# Patient Record
Sex: MALE | Race: WHITE | Employment: OTHER | URBAN - METROPOLITAN AREA
[De-identification: names, ages, dates, MRNs, and addresses within clinical notes are randomized per-mention and may not be internally consistent; named-entity substitution may affect disease eponyms.]

---

## 2017-11-17 PROBLEM — Z98.890: Status: ACTIVE | Noted: 2017-11-17

## 2018-06-29 ENCOUNTER — HOSPITAL ENCOUNTER (OUTPATIENT)
Age: 68
Discharge: HOME | End: 2018-07-01
Payer: MEDICARE

## 2018-06-29 PROCEDURE — 87077 CULTURE AEROBIC IDENTIFY: CPT

## 2018-06-29 PROCEDURE — 87088 URINE BACTERIA CULTURE: CPT

## 2018-06-29 PROCEDURE — 87186 SC STD MICRODIL/AGAR DIL: CPT

## 2018-07-03 LAB
ORGANISM: ABNORMAL
URINE CULTURE, ROUTINE: ABNORMAL
URINE CULTURE, ROUTINE: ABNORMAL

## 2018-09-11 ENCOUNTER — HOSPITAL ENCOUNTER (OUTPATIENT)
Age: 68
Discharge: HOME | End: 2018-09-13
Payer: MEDICARE

## 2018-09-11 LAB — PROSTATE SPECIFIC ANTIGEN: 3.66 NG/ML (ref 0–4)

## 2018-09-11 PROCEDURE — 84153 ASSAY OF PSA TOTAL: CPT

## 2019-06-07 ENCOUNTER — HOSPITAL ENCOUNTER (OUTPATIENT)
Age: 69
Discharge: HOME | End: 2019-06-09

## 2019-06-07 PROCEDURE — 87081 CULTURE SCREEN ONLY: CPT

## 2019-06-07 PROCEDURE — 88305 TISSUE EXAM BY PATHOLOGIST: CPT

## 2019-06-08 LAB — CLOTEST: NORMAL

## 2019-09-17 ENCOUNTER — HOSPITAL ENCOUNTER (OUTPATIENT)
Age: 69
Discharge: HOME | End: 2019-09-19
Payer: MEDICARE

## 2019-09-17 LAB — PROSTATE SPECIFIC ANTIGEN: 3.67 NG/ML (ref 0–4)

## 2019-09-17 PROCEDURE — 84153 ASSAY OF PSA TOTAL: CPT

## 2020-09-21 ENCOUNTER — HOSPITAL ENCOUNTER (OUTPATIENT)
Age: 70
Discharge: HOME | End: 2020-09-23
Payer: MEDICARE

## 2020-09-21 PROCEDURE — G0103 PSA SCREENING: HCPCS

## 2020-09-21 PROCEDURE — 84153 ASSAY OF PSA TOTAL: CPT

## 2020-09-29 LAB
PROSTATE SPECIFIC ANTIGEN: 5.26 NG/ML (ref 0–4)
PROSTATE SPECIFIC ANTIGEN: ABNORMAL NG/ML (ref 0–4)

## 2023-12-13 ENCOUNTER — HOSPITAL ENCOUNTER (OUTPATIENT)
Age: 73
Discharge: HOME | End: 2023-12-15

## 2023-12-13 PROCEDURE — 87077 CULTURE AEROBIC IDENTIFY: CPT

## 2023-12-14 LAB
HELIOBACTER PYLORI ID: NEGATIVE
SOURCE, 60200063: NORMAL

## 2024-10-03 ENCOUNTER — HOSPITAL ENCOUNTER (INPATIENT)
Age: 74
LOS: 12 days | Discharge: HOME HEALTH | DRG: 378 | End: 2024-10-15
Attending: EMERGENCY MEDICINE | Admitting: INTERNAL MEDICINE
Payer: MEDICARE

## 2024-10-03 ENCOUNTER — APPOINTMENT (OUTPATIENT)
Dept: NUCLEAR MEDICINE | Age: 74
DRG: 378 | End: 2024-10-03
Payer: MEDICARE

## 2024-10-03 DIAGNOSIS — K52.9 CHRONIC DIARRHEA: ICD-10-CM

## 2024-10-03 DIAGNOSIS — K92.2 LOWER GI BLEED: ICD-10-CM

## 2024-10-03 DIAGNOSIS — R60.0 EDEMA OF LEFT UPPER ARM: ICD-10-CM

## 2024-10-03 DIAGNOSIS — K92.1 HEMATOCHEZIA: ICD-10-CM

## 2024-10-03 DIAGNOSIS — R60.1 ANASARCA: Primary | ICD-10-CM

## 2024-10-03 PROBLEM — F32.9 MDD (MAJOR DEPRESSIVE DISORDER): Status: ACTIVE | Noted: 2024-10-03

## 2024-10-03 PROBLEM — R73.9 ACUTE HYPERGLYCEMIA: Status: ACTIVE | Noted: 2024-10-03

## 2024-10-03 PROBLEM — N17.9 ACUTE KIDNEY INJURY (HCC): Status: ACTIVE | Noted: 2024-10-03

## 2024-10-03 LAB
ALBUMIN SERPL-MCNC: 3.4 G/DL (ref 3.5–5.2)
ALP SERPL-CCNC: 50 U/L (ref 40–129)
ALT SERPL-CCNC: 8 U/L (ref 0–40)
ANION GAP SERPL CALCULATED.3IONS-SCNC: 13 MMOL/L (ref 7–16)
AST SERPL-CCNC: 14 U/L (ref 0–39)
BASOPHILS # BLD: 0.07 K/UL (ref 0–0.2)
BASOPHILS NFR BLD: 1 % (ref 0–2)
BILIRUB SERPL-MCNC: 0.2 MG/DL (ref 0–1.2)
BUN SERPL-MCNC: 28 MG/DL (ref 6–23)
CALCIUM SERPL-MCNC: 9.1 MG/DL (ref 8.6–10.2)
CHLORIDE SERPL-SCNC: 110 MMOL/L (ref 98–107)
CK SERPL-CCNC: 42 U/L (ref 20–200)
CO2 SERPL-SCNC: 14 MMOL/L (ref 22–29)
CREAT SERPL-MCNC: 2.4 MG/DL (ref 0.7–1.2)
EOSINOPHIL # BLD: 0.31 K/UL (ref 0.05–0.5)
EOSINOPHILS RELATIVE PERCENT: 2 % (ref 0–6)
ERYTHROCYTE [DISTWIDTH] IN BLOOD BY AUTOMATED COUNT: 15.4 % (ref 11.5–15)
FERRITIN SERPL-MCNC: 65 NG/ML
GFR, ESTIMATED: 27 ML/MIN/1.73M2
GLUCOSE BLD-MCNC: 138 MG/DL (ref 74–99)
GLUCOSE BLD-MCNC: 161 MG/DL (ref 74–99)
GLUCOSE SERPL-MCNC: 220 MG/DL (ref 74–99)
HBA1C MFR BLD: 5.5 % (ref 4–5.6)
HCT VFR BLD AUTO: 32.9 % (ref 37–54)
HCT VFR BLD AUTO: 33.6 % (ref 37–54)
HGB BLD-MCNC: 10.4 G/DL (ref 12.5–16.5)
HGB BLD-MCNC: 10.5 G/DL (ref 12.5–16.5)
HGB BLD-MCNC: 12.2 G/DL (ref 12.5–16.5)
IMM GRANULOCYTES # BLD AUTO: 0.07 K/UL (ref 0–0.58)
IMM GRANULOCYTES NFR BLD: 1 % (ref 0–5)
INR PPP: 1.9
IRON SATN MFR SERPL: 9 % (ref 20–55)
IRON SERPL-MCNC: 39 UG/DL (ref 59–158)
LYMPHOCYTES NFR BLD: 4.31 K/UL (ref 1.5–4)
LYMPHOCYTES RELATIVE PERCENT: 32 % (ref 20–42)
MAGNESIUM SERPL-MCNC: 1.7 MG/DL (ref 1.6–2.6)
MCH RBC QN AUTO: 30.8 PG (ref 26–35)
MCHC RBC AUTO-ENTMCNC: 31.3 G/DL (ref 32–34.5)
MCV RBC AUTO: 98.5 FL (ref 80–99.9)
MONOCYTES NFR BLD: 0.87 K/UL (ref 0.1–0.95)
MONOCYTES NFR BLD: 7 % (ref 2–12)
NEUTROPHILS NFR BLD: 58 % (ref 43–80)
NEUTS SEG NFR BLD: 7.77 K/UL (ref 1.8–7.3)
PLATELET # BLD AUTO: 354 K/UL (ref 130–450)
PMV BLD AUTO: 11.5 FL (ref 7–12)
POTASSIUM SERPL-SCNC: 3.8 MMOL/L (ref 3.5–5)
PROT SERPL-MCNC: 6.5 G/DL (ref 6.4–8.3)
PROTHROMBIN TIME: 19.9 SEC (ref 9.3–12.4)
RBC # BLD AUTO: 3.41 M/UL (ref 3.8–5.8)
SODIUM SERPL-SCNC: 137 MMOL/L (ref 132–146)
TIBC SERPL-MCNC: 413 UG/DL (ref 250–450)
WBC OTHER # BLD: 13.4 K/UL (ref 4.5–11.5)

## 2024-10-03 PROCEDURE — 2580000003 HC RX 258: Performed by: EMERGENCY MEDICINE

## 2024-10-03 PROCEDURE — 2060000000 HC ICU INTERMEDIATE R&B

## 2024-10-03 PROCEDURE — 80053 COMPREHEN METABOLIC PANEL: CPT

## 2024-10-03 PROCEDURE — 78278 ACUTE GI BLOOD LOSS IMAGING: CPT

## 2024-10-03 PROCEDURE — 85018 HEMOGLOBIN: CPT

## 2024-10-03 PROCEDURE — 82550 ASSAY OF CK (CPK): CPT

## 2024-10-03 PROCEDURE — 86920 COMPATIBILITY TEST SPIN: CPT

## 2024-10-03 PROCEDURE — 85014 HEMATOCRIT: CPT

## 2024-10-03 PROCEDURE — 82728 ASSAY OF FERRITIN: CPT

## 2024-10-03 PROCEDURE — 86901 BLOOD TYPING SEROLOGIC RH(D): CPT

## 2024-10-03 PROCEDURE — 2580000003 HC RX 258: Performed by: HOSPITALIST

## 2024-10-03 PROCEDURE — 87449 NOS EACH ORGANISM AG IA: CPT

## 2024-10-03 PROCEDURE — 6370000000 HC RX 637 (ALT 250 FOR IP): Performed by: HOSPITALIST

## 2024-10-03 PROCEDURE — 6360000002 HC RX W HCPCS: Performed by: EMERGENCY MEDICINE

## 2024-10-03 PROCEDURE — 83036 HEMOGLOBIN GLYCOSYLATED A1C: CPT

## 2024-10-03 PROCEDURE — 87324 CLOSTRIDIUM AG IA: CPT

## 2024-10-03 PROCEDURE — 3430000000 HC RX DIAGNOSTIC RADIOPHARMACEUTICAL: Performed by: RADIOLOGY

## 2024-10-03 PROCEDURE — 86900 BLOOD TYPING SEROLOGIC ABO: CPT

## 2024-10-03 PROCEDURE — 86923 COMPATIBILITY TEST ELECTRIC: CPT

## 2024-10-03 PROCEDURE — 83540 ASSAY OF IRON: CPT

## 2024-10-03 PROCEDURE — 30233N1 TRANSFUSION OF NONAUTOLOGOUS RED BLOOD CELLS INTO PERIPHERAL VEIN, PERCUTANEOUS APPROACH: ICD-10-PCS | Performed by: HOSPITALIST

## 2024-10-03 PROCEDURE — 99285 EMERGENCY DEPT VISIT HI MDM: CPT

## 2024-10-03 PROCEDURE — 83550 IRON BINDING TEST: CPT

## 2024-10-03 PROCEDURE — 82962 GLUCOSE BLOOD TEST: CPT

## 2024-10-03 PROCEDURE — 83735 ASSAY OF MAGNESIUM: CPT

## 2024-10-03 PROCEDURE — P9016 RBC LEUKOCYTES REDUCED: HCPCS

## 2024-10-03 PROCEDURE — 85610 PROTHROMBIN TIME: CPT

## 2024-10-03 PROCEDURE — 85025 COMPLETE CBC W/AUTO DIFF WBC: CPT

## 2024-10-03 PROCEDURE — A9560 TC99M LABELED RBC: HCPCS | Performed by: RADIOLOGY

## 2024-10-03 PROCEDURE — 86850 RBC ANTIBODY SCREEN: CPT

## 2024-10-03 RX ORDER — ONDANSETRON 2 MG/ML
4 INJECTION INTRAMUSCULAR; INTRAVENOUS EVERY 6 HOURS PRN
Status: DISCONTINUED | OUTPATIENT
Start: 2024-10-03 | End: 2024-10-15 | Stop reason: HOSPADM

## 2024-10-03 RX ORDER — 0.9 % SODIUM CHLORIDE 0.9 %
1000 INTRAVENOUS SOLUTION INTRAVENOUS ONCE
Status: COMPLETED | OUTPATIENT
Start: 2024-10-03 | End: 2024-10-03

## 2024-10-03 RX ORDER — SODIUM CHLORIDE 9 MG/ML
INJECTION, SOLUTION INTRAVENOUS CONTINUOUS
Status: DISCONTINUED | OUTPATIENT
Start: 2024-10-03 | End: 2024-10-04

## 2024-10-03 RX ORDER — CLONAZEPAM 0.5 MG/1
0.5 TABLET ORAL EVERY 12 HOURS PRN
Status: DISCONTINUED | OUTPATIENT
Start: 2024-10-03 | End: 2024-10-15 | Stop reason: HOSPADM

## 2024-10-03 RX ORDER — CETIRIZINE HYDROCHLORIDE 10 MG/1
10 TABLET ORAL DAILY
Status: DISCONTINUED | OUTPATIENT
Start: 2024-10-03 | End: 2024-10-15 | Stop reason: HOSPADM

## 2024-10-03 RX ORDER — FINASTERIDE 5 MG/1
5 TABLET, FILM COATED ORAL NIGHTLY
COMMUNITY

## 2024-10-03 RX ORDER — INSULIN LISPRO 100 [IU]/ML
0-4 INJECTION, SOLUTION INTRAVENOUS; SUBCUTANEOUS
Status: DISCONTINUED | OUTPATIENT
Start: 2024-10-03 | End: 2024-10-15 | Stop reason: HOSPADM

## 2024-10-03 RX ORDER — FINASTERIDE 5 MG/1
5 TABLET, FILM COATED ORAL NIGHTLY
Status: DISCONTINUED | OUTPATIENT
Start: 2024-10-03 | End: 2024-10-15 | Stop reason: HOSPADM

## 2024-10-03 RX ORDER — SODIUM CHLORIDE 0.9 % (FLUSH) 0.9 %
5-40 SYRINGE (ML) INJECTION PRN
Status: DISCONTINUED | OUTPATIENT
Start: 2024-10-03 | End: 2024-10-15 | Stop reason: HOSPADM

## 2024-10-03 RX ORDER — ASCORBIC ACID 500 MG
1000 TABLET ORAL
Status: DISCONTINUED | OUTPATIENT
Start: 2024-10-03 | End: 2024-10-15

## 2024-10-03 RX ORDER — PANTOPRAZOLE SODIUM 40 MG/1
40 TABLET, DELAYED RELEASE ORAL
Status: DISCONTINUED | OUTPATIENT
Start: 2024-10-03 | End: 2024-10-04

## 2024-10-03 RX ORDER — GLUCAGON 1 MG/ML
1 KIT INJECTION PRN
Status: DISCONTINUED | OUTPATIENT
Start: 2024-10-03 | End: 2024-10-15 | Stop reason: HOSPADM

## 2024-10-03 RX ORDER — ARIPIPRAZOLE 2 MG/1
2 TABLET ORAL EVERY MORNING
COMMUNITY

## 2024-10-03 RX ORDER — DICYCLOMINE HYDROCHLORIDE 10 MG/1
10 CAPSULE ORAL 2 TIMES DAILY WITH MEALS
Status: ON HOLD | COMMUNITY
End: 2024-10-15 | Stop reason: HOSPADM

## 2024-10-03 RX ORDER — DEXTROSE MONOHYDRATE 100 MG/ML
INJECTION, SOLUTION INTRAVENOUS CONTINUOUS PRN
Status: DISCONTINUED | OUTPATIENT
Start: 2024-10-03 | End: 2024-10-15 | Stop reason: HOSPADM

## 2024-10-03 RX ORDER — MIDODRINE HYDROCHLORIDE 5 MG/1
5 TABLET ORAL
Status: DISCONTINUED | OUTPATIENT
Start: 2024-10-03 | End: 2024-10-11

## 2024-10-03 RX ORDER — MULTIVIT WITH MINERALS/LUTEIN
1000 TABLET ORAL
Status: ON HOLD | COMMUNITY
End: 2024-10-15 | Stop reason: HOSPADM

## 2024-10-03 RX ORDER — SODIUM CHLORIDE 9 MG/ML
INJECTION, SOLUTION INTRAVENOUS PRN
Status: DISCONTINUED | OUTPATIENT
Start: 2024-10-03 | End: 2024-10-15 | Stop reason: HOSPADM

## 2024-10-03 RX ORDER — INSULIN LISPRO 100 [IU]/ML
0-4 INJECTION, SOLUTION INTRAVENOUS; SUBCUTANEOUS NIGHTLY
Status: DISCONTINUED | OUTPATIENT
Start: 2024-10-03 | End: 2024-10-15 | Stop reason: HOSPADM

## 2024-10-03 RX ORDER — ACETAMINOPHEN 325 MG/1
650 TABLET ORAL EVERY 6 HOURS PRN
Status: DISCONTINUED | OUTPATIENT
Start: 2024-10-03 | End: 2024-10-15 | Stop reason: HOSPADM

## 2024-10-03 RX ORDER — ONDANSETRON 4 MG/1
4 TABLET, ORALLY DISINTEGRATING ORAL EVERY 8 HOURS PRN
Status: DISCONTINUED | OUTPATIENT
Start: 2024-10-03 | End: 2024-10-15 | Stop reason: HOSPADM

## 2024-10-03 RX ORDER — HYDROCORTISONE 25 MG/G
CREAM TOPICAL 2 TIMES DAILY
Status: DISCONTINUED | OUTPATIENT
Start: 2024-10-03 | End: 2024-10-15 | Stop reason: HOSPADM

## 2024-10-03 RX ORDER — LEVOTHYROXINE SODIUM 75 UG/1
75 TABLET ORAL
Status: DISCONTINUED | OUTPATIENT
Start: 2024-10-04 | End: 2024-10-15 | Stop reason: HOSPADM

## 2024-10-03 RX ORDER — DULOXETIN HYDROCHLORIDE 60 MG/1
60 CAPSULE, DELAYED RELEASE ORAL 2 TIMES DAILY WITH MEALS
Status: DISCONTINUED | OUTPATIENT
Start: 2024-10-03 | End: 2024-10-15 | Stop reason: HOSPADM

## 2024-10-03 RX ORDER — ARIPIPRAZOLE 2 MG/1
2 TABLET ORAL EVERY MORNING
Status: DISCONTINUED | OUTPATIENT
Start: 2024-10-04 | End: 2024-10-15 | Stop reason: HOSPADM

## 2024-10-03 RX ORDER — MONTELUKAST SODIUM 10 MG/1
10 TABLET ORAL EVERY MORNING
Status: DISCONTINUED | OUTPATIENT
Start: 2024-10-04 | End: 2024-10-15 | Stop reason: HOSPADM

## 2024-10-03 RX ORDER — SODIUM CHLORIDE 9 MG/ML
50 INJECTION, SOLUTION INTRAVENOUS ONCE
Status: DISCONTINUED | OUTPATIENT
Start: 2024-10-03 | End: 2024-10-15 | Stop reason: HOSPADM

## 2024-10-03 RX ORDER — SODIUM CHLORIDE 0.9 % (FLUSH) 0.9 %
5-40 SYRINGE (ML) INJECTION EVERY 12 HOURS SCHEDULED
Status: DISCONTINUED | OUTPATIENT
Start: 2024-10-03 | End: 2024-10-15 | Stop reason: HOSPADM

## 2024-10-03 RX ORDER — TAMSULOSIN HYDROCHLORIDE 0.4 MG/1
0.4 CAPSULE ORAL NIGHTLY
Status: ON HOLD | COMMUNITY
End: 2024-10-15 | Stop reason: HOSPADM

## 2024-10-03 RX ORDER — TAMSULOSIN HYDROCHLORIDE 0.4 MG/1
0.4 CAPSULE ORAL NIGHTLY
Status: DISCONTINUED | OUTPATIENT
Start: 2024-10-03 | End: 2024-10-15

## 2024-10-03 RX ORDER — ACETAMINOPHEN 650 MG/1
650 SUPPOSITORY RECTAL EVERY 6 HOURS PRN
Status: DISCONTINUED | OUTPATIENT
Start: 2024-10-03 | End: 2024-10-15 | Stop reason: HOSPADM

## 2024-10-03 RX ADMIN — PROTHROMBIN COMPLEX CONCENTRATE (HUMAN) 2000 UNITS: 25.5; 16.5; 24; 22; 22; 26 POWDER, FOR SOLUTION INTRAVENOUS at 07:51

## 2024-10-03 RX ADMIN — SODIUM CHLORIDE, PRESERVATIVE FREE 10 ML: 5 INJECTION INTRAVENOUS at 21:38

## 2024-10-03 RX ADMIN — HYDROCORTISONE 2.5%: 25 CREAM TOPICAL at 21:38

## 2024-10-03 RX ADMIN — SODIUM CHLORIDE 1000 ML: 9 INJECTION, SOLUTION INTRAVENOUS at 17:08

## 2024-10-03 RX ADMIN — Medication 20 MILLICURIE: at 14:15

## 2024-10-03 RX ADMIN — CETIRIZINE HYDROCHLORIDE 10 MG: 10 TABLET, FILM COATED ORAL at 16:42

## 2024-10-03 RX ADMIN — PANTOPRAZOLE SODIUM 40 MG: 40 TABLET, DELAYED RELEASE ORAL at 16:42

## 2024-10-03 RX ADMIN — TAMSULOSIN HYDROCHLORIDE 0.4 MG: 0.4 CAPSULE ORAL at 21:37

## 2024-10-03 RX ADMIN — FINASTERIDE 5 MG: 5 TABLET, FILM COATED ORAL at 21:38

## 2024-10-03 RX ADMIN — OXYCODONE HYDROCHLORIDE AND ACETAMINOPHEN 1000 MG: 500 TABLET ORAL at 16:42

## 2024-10-03 RX ADMIN — DULOXETINE HYDROCHLORIDE 60 MG: 60 CAPSULE, DELAYED RELEASE ORAL at 16:42

## 2024-10-03 RX ADMIN — SODIUM CHLORIDE 1000 ML: 9 INJECTION, SOLUTION INTRAVENOUS at 07:35

## 2024-10-03 RX ADMIN — SODIUM CHLORIDE: 9 INJECTION, SOLUTION INTRAVENOUS at 17:49

## 2024-10-03 RX ADMIN — MIDODRINE HYDROCHLORIDE 5 MG: 5 TABLET ORAL at 17:21

## 2024-10-03 ASSESSMENT — PAIN SCALES - GENERAL
PAINLEVEL_OUTOF10: 6
PAINLEVEL_OUTOF10: 3

## 2024-10-03 ASSESSMENT — LIFESTYLE VARIABLES
HOW MANY STANDARD DRINKS CONTAINING ALCOHOL DO YOU HAVE ON A TYPICAL DAY: PATIENT DOES NOT DRINK
HOW OFTEN DO YOU HAVE A DRINK CONTAINING ALCOHOL: NEVER

## 2024-10-03 ASSESSMENT — PAIN DESCRIPTION - DESCRIPTORS: DESCRIPTORS: ACHING

## 2024-10-03 ASSESSMENT — PAIN - FUNCTIONAL ASSESSMENT
PAIN_FUNCTIONAL_ASSESSMENT: ACTIVITIES ARE NOT PREVENTED
PAIN_FUNCTIONAL_ASSESSMENT: 0-10

## 2024-10-03 ASSESSMENT — PAIN DESCRIPTION - LOCATION
LOCATION: BACK
LOCATION: ABDOMEN

## 2024-10-03 ASSESSMENT — PAIN DESCRIPTION - ORIENTATION
ORIENTATION: LEFT;LOWER
ORIENTATION: LOWER

## 2024-10-03 NOTE — ACP (ADVANCE CARE PLANNING)
Advance Care Planning   Healthcare Decision Maker:    Primary Decision Maker: Alvina Kwok - [facility] - [phone]    Click here to complete Healthcare Decision Makers including selection of the Healthcare Decision Maker Relationship (ie \"Primary\").  Today we documented Decision Maker(s) consistent with Legal Next of Kin hierarchy.

## 2024-10-03 NOTE — H&P
Internal Medicine History & Physical     Name: Anuel Kwok  : 1950  Chief Complaint: Rectal Bleeding (Bright red blood rectally since 2am, hx of bowel rescation )  Primary Care Physician: Valentín Christianson MD  Admission date: 10/3/2024  Date of service: 10/3/2024     History of Present Illness  Anuel is a 74 y.o. year old male.  Patient presents with dizziness and feeling ill with initiation of bright red blood per rectum starting around 2 AM.  Patient has had bowel surgeries in the past and has not had any history of bloody stools according to him.  He denies any fever, chills, headache, vision or hearing changes, dysuria, hematuria.  Patient states that he is still not feeling well while getting his nuclear medicine scan.  Feels dizzy and lightheaded while laying down.  Hemoglobin on arrival to the ER was 10 and he received a unit of blood at that time.  He now states that he is hungry and wants to eat.  He is on Xarelto for history of pulmonary emboli bilaterally.  Patient states nothing is making better at home.  Still having bloody stools as of this afternoon.  Patient to be seen by GI for further recommendations.  Bleeding scan was ordered.      ED course:   Initial blood work and imaging studies performed. Admission recommended by ED physician. Case was discussed with ED provider. Meds in ED consisted of the following:  Medications   prothrombin complex human-lans (BALFAXAR) infusion 2,000 Units (2,000 Units IntraVENous New Bag 10/3/24 0751)     Followed by   0.9 % sodium chloride infusion (50 mLs IntraVENous Not Given 10/3/24 6077)   0.9 % sodium chloride infusion (has no administration in time range)   pantoprazole (PROTONIX) tablet 40 mg (has no administration in time range)   hydrocortisone (ANUSOL-HC) 2.5 % rectal cream (has no administration in time range)   sodium chloride 0.9 % bolus 1,000 mL (0 mLs IntraVENous Stopped 10/3/24 0752)       Past Medical History:   Diagnosis Date

## 2024-10-03 NOTE — CARE COORDINATION
Social Work/Discharge Planning:  Met with patient and his wife Alvina and completed initial assessment.  Explained Social Work role and discussed transition of care/discharge planning.  Patient lives with his wife in a one story house with two steps to enter.  PTA he is independent with no adaptive device.  He has a shower chair at home.  He has a history at [facility].  Plan is home at discharge and he denies any home care needs at this time.  Will continue to follow and assist if needed.  Electronically signed by CHATO Yuan on 10/3/2024 at 1:59 PM

## 2024-10-03 NOTE — ED NOTES
ED to Inpatient Handoff Report    Notified Cele that electronic handoff available and patient ready for transport to room 431.    Safety Risks: None identified    Patient in Restraints: no    Constant Observer or Patient : no    Telemetry Monitoring Ordered :Yes           Order to transfer to unit without monitor:N/A    Last MEWS: 1 Time completed: 0913    Deterioration Index Score:   Predictive Model Details          24 (Normal)  Factor Value    Calculated 10/3/2024 09:20 55% Age 74 years old    Deterioration Index Model 18% Respiratory rate 19     13% WBC count abnormal (13.4 k/uL)     4% BUN abnormal (28 mg/dL)     4% Pulse 92     3% Pulse oximetry 99 %     2% Sodium 137 mmol/L     2% Hematocrit abnormal (33.6 %)     1% Potassium 3.8 mmol/L     0% Systolic 111     0% Temperature 98.2 °F (36.8 °C)        Vitals:    10/03/24 0748 10/03/24 0812 10/03/24 0910 10/03/24 0913   BP: 100/63 97/60 109/70 111/73   Pulse: 93 92 93 92   Resp: 20 16 20 19   Temp: 98.1 °F (36.7 °C)  98.2 °F (36.8 °C) 98.2 °F (36.8 °C)   SpO2: 97% 98% 100% 99%   Weight:       Height:             Opportunity for questions and clarification was provided.

## 2024-10-03 NOTE — ED PROVIDER NOTES
HPI:  10/3/24,   Time: 7:06 AM EDT       Anuel Kwok is a 74 y.o. male presenting to the ED for gi bleed, beginning 5 hrs ago.  The complaint has been persistent, severe in severity, and worsened by nothing.  Brought in by EMS.  Rectal bleeding since this morning.  Feels fatigue lightheaded.  No abdominal pain.  No nausea vomiting.  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          --------------------------------------------- PAST HISTORY ---------------------------------------------  Past Medical History:  has a past medical history of Anxiety, Anxiety state, unspecified, Depression, Fracture cervical vertebra-closed (HCC), Fracture of rib, GERD (gastroesophageal reflux disease), GERD (gastroesophageal reflux disease), Hyperlipidemia, Hyperthyroidism, Hypothyroidism, Irritable bowel syndrome, Nonunion of fracture, Pneumonia, Sacral fracture (HCC), Thyroid disease, Tibia/fibula fracture, Type II or unspecified type diabetes mellitus without mention of complication, not stated as uncontrolled, and Urinary incontinence.    Past Surgical History:  has a past surgical history that includes colectomy (10/22/13); thrombectomy (Left, 10/23/13); fixation device application (Left, 10/23/13); Splenectomy (10/24/2013); colectomy (10/24/2013); Splenectomy (10-); Abdominal exploration surgery; Cholecystectomy; colectomy; other surgical history (01/14/13); fracture surgery; and other surgical history (Left, 6/3/14).    Social History:  reports that he has quit smoking. His smoking use included cigarettes. He has a 25 pack-year smoking history. His smokeless tobacco use includes chew. He reports that he does not drink alcohol and does not use drugs.    Family History: family history includes Heart Failure in his father; High Cholesterol in his father;

## 2024-10-04 ENCOUNTER — APPOINTMENT (OUTPATIENT)
Dept: CT IMAGING | Age: 74
DRG: 378 | End: 2024-10-04
Payer: MEDICARE

## 2024-10-04 LAB
AMMONIA PLAS-SCNC: 29 UMOL/L (ref 16–60)
ANION GAP SERPL CALCULATED.3IONS-SCNC: 11 MMOL/L (ref 7–16)
ANION GAP SERPL CALCULATED.3IONS-SCNC: 12 MMOL/L (ref 7–16)
ANION GAP SERPL CALCULATED.3IONS-SCNC: 15 MMOL/L (ref 7–16)
BUN SERPL-MCNC: 35 MG/DL (ref 6–23)
BUN SERPL-MCNC: 37 MG/DL (ref 6–23)
BUN SERPL-MCNC: 37 MG/DL (ref 6–23)
C DIFF GDH + TOXINS A+B STL QL IA.RAPID: NEGATIVE
CALCIUM SERPL-MCNC: 7 MG/DL (ref 8.6–10.2)
CALCIUM SERPL-MCNC: 7.4 MG/DL (ref 8.6–10.2)
CALCIUM SERPL-MCNC: 8 MG/DL (ref 8.6–10.2)
CHLORIDE SERPL-SCNC: 112 MMOL/L (ref 98–107)
CHLORIDE SERPL-SCNC: 115 MMOL/L (ref 98–107)
CHLORIDE SERPL-SCNC: 119 MMOL/L (ref 98–107)
CHLORIDE UR-SCNC: 68 MMOL/L
CO2 SERPL-SCNC: 11 MMOL/L (ref 22–29)
CO2 SERPL-SCNC: 12 MMOL/L (ref 22–29)
CO2 SERPL-SCNC: 16 MMOL/L (ref 22–29)
CREAT SERPL-MCNC: 3.2 MG/DL (ref 0.7–1.2)
CREAT SERPL-MCNC: 3.2 MG/DL (ref 0.7–1.2)
CREAT SERPL-MCNC: 3.3 MG/DL (ref 0.7–1.2)
CREAT UR-MCNC: 212.4 MG/DL (ref 40–278)
CREAT UR-MCNC: 218.7 MG/DL (ref 40–278)
ERYTHROCYTE [DISTWIDTH] IN BLOOD BY AUTOMATED COUNT: 16.3 % (ref 11.5–15)
GFR, ESTIMATED: 19 ML/MIN/1.73M2
GFR, ESTIMATED: 19 ML/MIN/1.73M2
GFR, ESTIMATED: 20 ML/MIN/1.73M2
GLUCOSE BLD-MCNC: 113 MG/DL (ref 74–99)
GLUCOSE BLD-MCNC: 142 MG/DL (ref 74–99)
GLUCOSE BLD-MCNC: 158 MG/DL (ref 74–99)
GLUCOSE BLD-MCNC: 196 MG/DL (ref 74–99)
GLUCOSE SERPL-MCNC: 132 MG/DL (ref 74–99)
GLUCOSE SERPL-MCNC: 188 MG/DL (ref 74–99)
GLUCOSE SERPL-MCNC: 198 MG/DL (ref 74–99)
HCT VFR BLD AUTO: 21.4 % (ref 37–54)
HCT VFR BLD AUTO: 26 % (ref 37–54)
HCT VFR BLD AUTO: 30.8 % (ref 37–54)
HGB BLD-MCNC: 6.9 G/DL (ref 12.5–16.5)
HGB BLD-MCNC: 8.3 G/DL (ref 12.5–16.5)
HGB BLD-MCNC: 9.7 G/DL (ref 12.5–16.5)
LACTATE BLDV-SCNC: 2.8 MMOL/L (ref 0.5–2.2)
LACTATE BLDV-SCNC: 2.9 MMOL/L (ref 0.5–2.2)
LACTATE BLDV-SCNC: 4 MMOL/L (ref 0.5–2.2)
MCH RBC QN AUTO: 30.8 PG (ref 26–35)
MCHC RBC AUTO-ENTMCNC: 31.5 G/DL (ref 32–34.5)
MCV RBC AUTO: 97.8 FL (ref 80–99.9)
PHOSPHATE SERPL-MCNC: 4.5 MG/DL (ref 2.5–4.5)
PLATELET # BLD AUTO: 224 K/UL (ref 130–450)
PMV BLD AUTO: 11.7 FL (ref 7–12)
POTASSIUM SERPL-SCNC: 3.6 MMOL/L (ref 3.5–5)
POTASSIUM SERPL-SCNC: 4.4 MMOL/L (ref 3.5–5)
POTASSIUM SERPL-SCNC: 5.2 MMOL/L (ref 3.5–5)
RBC # BLD AUTO: 3.15 M/UL (ref 3.8–5.8)
SODIUM SERPL-SCNC: 139 MMOL/L (ref 132–146)
SODIUM SERPL-SCNC: 142 MMOL/L (ref 132–146)
SODIUM SERPL-SCNC: 142 MMOL/L (ref 132–146)
SODIUM UR-SCNC: 39 MMOL/L
SPECIMEN DESCRIPTION: NORMAL
TOTAL PROTEIN, URINE: 26 MG/DL (ref 0–12)
TOTAL PROTEIN, URINE: 26 MG/DL (ref 0–12)
URINE TOTAL PROTEIN CREATININE RATIO: 0.12 (ref 0–0.2)
WBC OTHER # BLD: 19.6 K/UL (ref 4.5–11.5)

## 2024-10-04 PROCEDURE — 97165 OT EVAL LOW COMPLEX 30 MIN: CPT

## 2024-10-04 PROCEDURE — 2580000003 HC RX 258: Performed by: NURSE PRACTITIONER

## 2024-10-04 PROCEDURE — 82570 ASSAY OF URINE CREATININE: CPT

## 2024-10-04 PROCEDURE — 82436 ASSAY OF URINE CHLORIDE: CPT

## 2024-10-04 PROCEDURE — 6360000002 HC RX W HCPCS

## 2024-10-04 PROCEDURE — 97530 THERAPEUTIC ACTIVITIES: CPT

## 2024-10-04 PROCEDURE — 85018 HEMOGLOBIN: CPT

## 2024-10-04 PROCEDURE — P9016 RBC LEUKOCYTES REDUCED: HCPCS

## 2024-10-04 PROCEDURE — 2500000003 HC RX 250 WO HCPCS

## 2024-10-04 PROCEDURE — 2580000003 HC RX 258: Performed by: HOSPITALIST

## 2024-10-04 PROCEDURE — 82962 GLUCOSE BLOOD TEST: CPT

## 2024-10-04 PROCEDURE — 80048 BASIC METABOLIC PNL TOTAL CA: CPT

## 2024-10-04 PROCEDURE — 85027 COMPLETE CBC AUTOMATED: CPT

## 2024-10-04 PROCEDURE — 84300 ASSAY OF URINE SODIUM: CPT

## 2024-10-04 PROCEDURE — 82140 ASSAY OF AMMONIA: CPT

## 2024-10-04 PROCEDURE — 84100 ASSAY OF PHOSPHORUS: CPT

## 2024-10-04 PROCEDURE — 36430 TRANSFUSION BLD/BLD COMPNT: CPT

## 2024-10-04 PROCEDURE — 2060000000 HC ICU INTERMEDIATE R&B

## 2024-10-04 PROCEDURE — 85014 HEMATOCRIT: CPT

## 2024-10-04 PROCEDURE — 2580000003 HC RX 258

## 2024-10-04 PROCEDURE — 6370000000 HC RX 637 (ALT 250 FOR IP): Performed by: HOSPITALIST

## 2024-10-04 PROCEDURE — 84156 ASSAY OF PROTEIN URINE: CPT

## 2024-10-04 PROCEDURE — 83605 ASSAY OF LACTIC ACID: CPT

## 2024-10-04 PROCEDURE — 74176 CT ABD & PELVIS W/O CONTRAST: CPT

## 2024-10-04 PROCEDURE — 6360000002 HC RX W HCPCS: Performed by: INTERNAL MEDICINE

## 2024-10-04 RX ORDER — SODIUM CHLORIDE 9 MG/ML
INJECTION, SOLUTION INTRAVENOUS PRN
Status: DISCONTINUED | OUTPATIENT
Start: 2024-10-04 | End: 2024-10-15 | Stop reason: HOSPADM

## 2024-10-04 RX ORDER — LEVOFLOXACIN 5 MG/ML
750 INJECTION, SOLUTION INTRAVENOUS
Status: DISCONTINUED | OUTPATIENT
Start: 2024-10-04 | End: 2024-10-11 | Stop reason: CLARIF

## 2024-10-04 RX ORDER — SODIUM CHLORIDE, SODIUM LACTATE, POTASSIUM CHLORIDE, CALCIUM CHLORIDE 600; 310; 30; 20 MG/100ML; MG/100ML; MG/100ML; MG/100ML
INJECTION, SOLUTION INTRAVENOUS CONTINUOUS
Status: DISCONTINUED | OUTPATIENT
Start: 2024-10-04 | End: 2024-10-04

## 2024-10-04 RX ORDER — LANOLIN ALCOHOL/MO/W.PET/CERES
3 CREAM (GRAM) TOPICAL NIGHTLY PRN
Status: DISCONTINUED | OUTPATIENT
Start: 2024-10-04 | End: 2024-10-15 | Stop reason: HOSPADM

## 2024-10-04 RX ORDER — METRONIDAZOLE 500 MG/100ML
500 INJECTION, SOLUTION INTRAVENOUS EVERY 8 HOURS
Status: DISCONTINUED | OUTPATIENT
Start: 2024-10-04 | End: 2024-10-11 | Stop reason: CLARIF

## 2024-10-04 RX ORDER — PANTOPRAZOLE SODIUM 40 MG/10ML
40 INJECTION, POWDER, LYOPHILIZED, FOR SOLUTION INTRAVENOUS 2 TIMES DAILY
Status: DISCONTINUED | OUTPATIENT
Start: 2024-10-04 | End: 2024-10-15 | Stop reason: HOSPADM

## 2024-10-04 RX ADMIN — DULOXETINE HYDROCHLORIDE 60 MG: 60 CAPSULE, DELAYED RELEASE ORAL at 08:14

## 2024-10-04 RX ADMIN — SODIUM CHLORIDE, POTASSIUM CHLORIDE, SODIUM LACTATE AND CALCIUM CHLORIDE: 600; 310; 30; 20 INJECTION, SOLUTION INTRAVENOUS at 08:23

## 2024-10-04 RX ADMIN — METRONIDAZOLE 500 MG: 500 INJECTION, SOLUTION INTRAVENOUS at 23:29

## 2024-10-04 RX ADMIN — LEVOFLOXACIN 750 MG: 750 INJECTION, SOLUTION INTRAVENOUS at 16:03

## 2024-10-04 RX ADMIN — ACETAMINOPHEN 650 MG: 325 TABLET ORAL at 05:59

## 2024-10-04 RX ADMIN — HYDROCORTISONE 2.5%: 25 CREAM TOPICAL at 08:15

## 2024-10-04 RX ADMIN — MIDODRINE HYDROCHLORIDE 5 MG: 5 TABLET ORAL at 16:03

## 2024-10-04 RX ADMIN — DULOXETINE HYDROCHLORIDE 60 MG: 60 CAPSULE, DELAYED RELEASE ORAL at 16:03

## 2024-10-04 RX ADMIN — SODIUM CHLORIDE 100 MG: 9 INJECTION, SOLUTION INTRAVENOUS at 14:31

## 2024-10-04 RX ADMIN — HYDROCORTISONE 2.5%: 25 CREAM TOPICAL at 20:15

## 2024-10-04 RX ADMIN — SODIUM BICARBONATE: 84 INJECTION, SOLUTION INTRAVENOUS at 09:40

## 2024-10-04 RX ADMIN — PANTOPRAZOLE SODIUM 40 MG: 40 TABLET, DELAYED RELEASE ORAL at 05:59

## 2024-10-04 RX ADMIN — SODIUM CHLORIDE: 9 INJECTION, SOLUTION INTRAVENOUS at 00:01

## 2024-10-04 RX ADMIN — SODIUM CHLORIDE: 9 INJECTION, SOLUTION INTRAVENOUS at 05:56

## 2024-10-04 RX ADMIN — LEVOTHYROXINE SODIUM 75 MCG: 75 TABLET ORAL at 05:59

## 2024-10-04 RX ADMIN — ARIPIPRAZOLE 2 MG: 2 TABLET ORAL at 08:14

## 2024-10-04 RX ADMIN — SODIUM CHLORIDE 25 MG: 9 INJECTION, SOLUTION INTRAVENOUS at 12:51

## 2024-10-04 RX ADMIN — PANTOPRAZOLE SODIUM 40 MG: 40 INJECTION, POWDER, FOR SOLUTION INTRAVENOUS at 20:14

## 2024-10-04 RX ADMIN — METRONIDAZOLE 500 MG: 500 INJECTION, SOLUTION INTRAVENOUS at 14:19

## 2024-10-04 RX ADMIN — MIDODRINE HYDROCHLORIDE 5 MG: 5 TABLET ORAL at 08:13

## 2024-10-04 RX ADMIN — MONTELUKAST 10 MG: 10 TABLET, FILM COATED ORAL at 08:13

## 2024-10-04 RX ADMIN — MIDODRINE HYDROCHLORIDE 5 MG: 5 TABLET ORAL at 12:38

## 2024-10-04 RX ADMIN — CETIRIZINE HYDROCHLORIDE 10 MG: 10 TABLET, FILM COATED ORAL at 08:14

## 2024-10-04 RX ADMIN — SODIUM CHLORIDE, PRESERVATIVE FREE 10 ML: 5 INJECTION INTRAVENOUS at 20:15

## 2024-10-04 RX ADMIN — SODIUM CHLORIDE, PRESERVATIVE FREE 10 ML: 5 INJECTION INTRAVENOUS at 08:14

## 2024-10-04 RX ADMIN — SODIUM BICARBONATE: 84 INJECTION, SOLUTION INTRAVENOUS at 19:00

## 2024-10-04 RX ADMIN — FINASTERIDE 5 MG: 5 TABLET, FILM COATED ORAL at 20:14

## 2024-10-04 RX ADMIN — CLONAZEPAM 0.5 MG: 0.5 TABLET ORAL at 12:38

## 2024-10-04 ASSESSMENT — PAIN DESCRIPTION - ORIENTATION: ORIENTATION: LOWER

## 2024-10-04 ASSESSMENT — PAIN DESCRIPTION - DESCRIPTORS: DESCRIPTORS: ACHING;DISCOMFORT

## 2024-10-04 ASSESSMENT — PAIN SCALES - GENERAL: PAINLEVEL_OUTOF10: 2

## 2024-10-04 ASSESSMENT — PAIN DESCRIPTION - LOCATION: LOCATION: BACK

## 2024-10-04 NOTE — PLAN OF CARE
Problem: Chronic Conditions and Co-morbidities  Goal: Patient's chronic conditions and co-morbidity symptoms are monitored and maintained or improved  10/3/2024 2338 by Silvia Keating RN  Outcome: Progressing  10/3/2024 1048 by Reva Garcia RN  Outcome: Progressing     Problem: Discharge Planning  Goal: Discharge to home or other facility with appropriate resources  10/3/2024 2338 by Silvia Keating RN  Outcome: Progressing  10/3/2024 1048 by Reva Garcia RN  Outcome: Progressing     Problem: Pain  Goal: Verbalizes/displays adequate comfort level or baseline comfort level  10/3/2024 2338 by Silvia Keating RN  Outcome: Progressing  10/3/2024 1048 by Reva Garcia RN  Outcome: Progressing     Problem: Skin/Tissue Integrity  Goal: Absence of new skin breakdown  Description: 1.  Monitor for areas of redness and/or skin breakdown  2.  Assess vascular access sites hourly  3.  Every 4-6 hours minimum:  Change oxygen saturation probe site  4.  Every 4-6 hours:  If on nasal continuous positive airway pressure, respiratory therapy assess nares and determine need for appliance change or resting period.  10/3/2024 2338 by Silvia Keating RN  Outcome: Progressing  10/3/2024 1048 by Reva Garcia RN  Outcome: Progressing     Problem: Safety - Adult  Goal: Free from fall injury  10/3/2024 2338 by Silvia Keating RN  Outcome: Progressing  10/3/2024 1048 by Reva Garcia RN  Outcome: Progressing     Problem: ABCDS Injury Assessment  Goal: Absence of physical injury  10/3/2024 2338 by Silvia Keating RN  Outcome: Progressing  10/3/2024 1048 by Reva Garcia RN  Outcome: Progressing

## 2024-10-04 NOTE — PLAN OF CARE
Problem: Chronic Conditions and Co-morbidities  Goal: Patient's chronic conditions and co-morbidity symptoms are monitored and maintained or improved  10/4/2024 1053 by Reva Garcia RN  Outcome: Progressing     Problem: Discharge Planning  Goal: Discharge to home or other facility with appropriate resources  10/4/2024 1053 by Reva Garcia RN  Outcome: Progressing     Problem: Pain  Goal: Verbalizes/displays adequate comfort level or baseline comfort level  10/4/2024 1053 by Reva Garcia RN  Outcome: Progressing     Problem: Skin/Tissue Integrity  Goal: Absence of new skin breakdown  Description: 1.  Monitor for areas of redness and/or skin breakdown  2.  Assess vascular access sites hourly  3.  Every 4-6 hours minimum:  Change oxygen saturation probe site  4.  Every 4-6 hours:  If on nasal continuous positive airway pressure, respiratory therapy assess nares and determine need for appliance change or resting period.  10/4/2024 1053 by Reva Garcia RN  Outcome: Progressing     Problem: Safety - Adult  Goal: Free from fall injury  10/4/2024 1053 by Reva Garcia RN  Outcome: Progressing     Problem: ABCDS Injury Assessment  Goal: Absence of physical injury  10/4/2024 1053 by Reva Garcia RN  Outcome: Progressing

## 2024-10-05 LAB
ANION GAP SERPL CALCULATED.3IONS-SCNC: 8 MMOL/L (ref 7–16)
ANION GAP SERPL CALCULATED.3IONS-SCNC: 9 MMOL/L (ref 7–16)
BUN SERPL-MCNC: 34 MG/DL (ref 6–23)
BUN SERPL-MCNC: 37 MG/DL (ref 6–23)
CALCIUM SERPL-MCNC: 7.1 MG/DL (ref 8.6–10.2)
CALCIUM SERPL-MCNC: 7.4 MG/DL (ref 8.6–10.2)
CHLORIDE SERPL-SCNC: 111 MMOL/L (ref 98–107)
CHLORIDE SERPL-SCNC: 113 MMOL/L (ref 98–107)
CO2 SERPL-SCNC: 19 MMOL/L (ref 22–29)
CO2 SERPL-SCNC: 24 MMOL/L (ref 22–29)
CREAT SERPL-MCNC: 2.9 MG/DL (ref 0.7–1.2)
CREAT SERPL-MCNC: 3 MG/DL (ref 0.7–1.2)
GFR, ESTIMATED: 21 ML/MIN/1.73M2
GFR, ESTIMATED: 22 ML/MIN/1.73M2
GLUCOSE BLD-MCNC: 111 MG/DL (ref 74–99)
GLUCOSE BLD-MCNC: 117 MG/DL (ref 74–99)
GLUCOSE BLD-MCNC: 142 MG/DL (ref 74–99)
GLUCOSE BLD-MCNC: 152 MG/DL (ref 74–99)
GLUCOSE SERPL-MCNC: 149 MG/DL (ref 74–99)
GLUCOSE SERPL-MCNC: 155 MG/DL (ref 74–99)
HCT VFR BLD AUTO: 21.1 % (ref 37–54)
HCT VFR BLD AUTO: 21.2 % (ref 37–54)
HCT VFR BLD AUTO: 21.9 % (ref 37–54)
HCT VFR BLD AUTO: 24.3 % (ref 37–54)
HGB BLD-MCNC: 7 G/DL (ref 12.5–16.5)
HGB BLD-MCNC: 7.2 G/DL (ref 12.5–16.5)
HGB BLD-MCNC: 7.4 G/DL (ref 12.5–16.5)
HGB BLD-MCNC: 8 G/DL (ref 12.5–16.5)
LACTATE BLDV-SCNC: 2.3 MMOL/L (ref 0.5–2.2)
LACTATE BLDV-SCNC: 2.3 MMOL/L (ref 0.5–2.2)
LACTATE BLDV-SCNC: 3.4 MMOL/L (ref 0.5–2.2)
LACTATE BLDV-SCNC: 4.3 MMOL/L (ref 0.5–2.2)
MAGNESIUM SERPL-MCNC: 1.3 MG/DL (ref 1.6–2.6)
POTASSIUM SERPL-SCNC: 3.5 MMOL/L (ref 3.5–5)
POTASSIUM SERPL-SCNC: 3.8 MMOL/L (ref 3.5–5)
SODIUM SERPL-SCNC: 140 MMOL/L (ref 132–146)
SODIUM SERPL-SCNC: 144 MMOL/L (ref 132–146)

## 2024-10-05 PROCEDURE — 85014 HEMATOCRIT: CPT

## 2024-10-05 PROCEDURE — 85018 HEMOGLOBIN: CPT

## 2024-10-05 PROCEDURE — 2500000003 HC RX 250 WO HCPCS

## 2024-10-05 PROCEDURE — 2580000003 HC RX 258

## 2024-10-05 PROCEDURE — 80048 BASIC METABOLIC PNL TOTAL CA: CPT

## 2024-10-05 PROCEDURE — 2580000003 HC RX 258: Performed by: INTERNAL MEDICINE

## 2024-10-05 PROCEDURE — 36415 COLL VENOUS BLD VENIPUNCTURE: CPT

## 2024-10-05 PROCEDURE — 2060000000 HC ICU INTERMEDIATE R&B

## 2024-10-05 PROCEDURE — 83735 ASSAY OF MAGNESIUM: CPT

## 2024-10-05 PROCEDURE — 6360000002 HC RX W HCPCS: Performed by: INTERNAL MEDICINE

## 2024-10-05 PROCEDURE — 97161 PT EVAL LOW COMPLEX 20 MIN: CPT

## 2024-10-05 PROCEDURE — 6360000002 HC RX W HCPCS

## 2024-10-05 PROCEDURE — 83605 ASSAY OF LACTIC ACID: CPT

## 2024-10-05 PROCEDURE — 2580000003 HC RX 258: Performed by: HOSPITALIST

## 2024-10-05 PROCEDURE — 82962 GLUCOSE BLOOD TEST: CPT

## 2024-10-05 PROCEDURE — 6370000000 HC RX 637 (ALT 250 FOR IP): Performed by: INTERNAL MEDICINE

## 2024-10-05 PROCEDURE — 2580000003 HC RX 258: Performed by: NURSE PRACTITIONER

## 2024-10-05 PROCEDURE — 6370000000 HC RX 637 (ALT 250 FOR IP): Performed by: HOSPITALIST

## 2024-10-05 RX ORDER — POTASSIUM CHLORIDE 1500 MG/1
20 TABLET, EXTENDED RELEASE ORAL ONCE
Status: COMPLETED | OUTPATIENT
Start: 2024-10-05 | End: 2024-10-05

## 2024-10-05 RX ORDER — SODIUM CHLORIDE 9 MG/ML
INJECTION, SOLUTION INTRAVENOUS CONTINUOUS
Status: DISCONTINUED | OUTPATIENT
Start: 2024-10-05 | End: 2024-10-06

## 2024-10-05 RX ADMIN — POTASSIUM CHLORIDE 20 MEQ: 1500 TABLET, EXTENDED RELEASE ORAL at 12:59

## 2024-10-05 RX ADMIN — MIDODRINE HYDROCHLORIDE 5 MG: 5 TABLET ORAL at 09:35

## 2024-10-05 RX ADMIN — MAGNESIUM SULFATE HEPTAHYDRATE 3000 MG: 500 INJECTION, SOLUTION INTRAMUSCULAR; INTRAVENOUS at 13:01

## 2024-10-05 RX ADMIN — SODIUM CHLORIDE: 9 INJECTION, SOLUTION INTRAVENOUS at 18:02

## 2024-10-05 RX ADMIN — SODIUM BICARBONATE: 84 INJECTION, SOLUTION INTRAVENOUS at 10:29

## 2024-10-05 RX ADMIN — FINASTERIDE 5 MG: 5 TABLET, FILM COATED ORAL at 21:37

## 2024-10-05 RX ADMIN — CETIRIZINE HYDROCHLORIDE 10 MG: 10 TABLET, FILM COATED ORAL at 09:35

## 2024-10-05 RX ADMIN — DULOXETINE HYDROCHLORIDE 60 MG: 60 CAPSULE, DELAYED RELEASE ORAL at 09:35

## 2024-10-05 RX ADMIN — ARIPIPRAZOLE 2 MG: 2 TABLET ORAL at 09:37

## 2024-10-05 RX ADMIN — SODIUM CHLORIDE, PRESERVATIVE FREE 10 ML: 5 INJECTION INTRAVENOUS at 21:38

## 2024-10-05 RX ADMIN — METRONIDAZOLE 500 MG: 500 INJECTION, SOLUTION INTRAVENOUS at 23:36

## 2024-10-05 RX ADMIN — HYDROCORTISONE 2.5%: 25 CREAM TOPICAL at 09:37

## 2024-10-05 RX ADMIN — PANTOPRAZOLE SODIUM 40 MG: 40 INJECTION, POWDER, FOR SOLUTION INTRAVENOUS at 21:37

## 2024-10-05 RX ADMIN — SODIUM BICARBONATE: 84 INJECTION, SOLUTION INTRAVENOUS at 03:18

## 2024-10-05 RX ADMIN — DULOXETINE HYDROCHLORIDE 60 MG: 60 CAPSULE, DELAYED RELEASE ORAL at 17:55

## 2024-10-05 RX ADMIN — SODIUM CHLORIDE 125 MG: 900 INJECTION INTRAVENOUS at 11:09

## 2024-10-05 RX ADMIN — HYDROCORTISONE 2.5%: 25 CREAM TOPICAL at 21:38

## 2024-10-05 RX ADMIN — CLONAZEPAM 0.5 MG: 0.5 TABLET ORAL at 17:55

## 2024-10-05 RX ADMIN — MIDODRINE HYDROCHLORIDE 5 MG: 5 TABLET ORAL at 12:59

## 2024-10-05 RX ADMIN — SODIUM CHLORIDE, PRESERVATIVE FREE 10 ML: 5 INJECTION INTRAVENOUS at 09:36

## 2024-10-05 RX ADMIN — METRONIDAZOLE 500 MG: 500 INJECTION, SOLUTION INTRAVENOUS at 09:41

## 2024-10-05 RX ADMIN — MONTELUKAST 10 MG: 10 TABLET, FILM COATED ORAL at 09:35

## 2024-10-05 RX ADMIN — MIDODRINE HYDROCHLORIDE 5 MG: 5 TABLET ORAL at 17:55

## 2024-10-05 RX ADMIN — PANTOPRAZOLE SODIUM 40 MG: 40 INJECTION, POWDER, FOR SOLUTION INTRAVENOUS at 09:35

## 2024-10-05 RX ADMIN — METRONIDAZOLE 500 MG: 500 INJECTION, SOLUTION INTRAVENOUS at 18:03

## 2024-10-05 RX ADMIN — LEVOTHYROXINE SODIUM 75 MCG: 75 TABLET ORAL at 06:33

## 2024-10-05 ASSESSMENT — PAIN SCALES - GENERAL: PAINLEVEL_OUTOF10: 0

## 2024-10-05 NOTE — PLAN OF CARE
Problem: Chronic Conditions and Co-morbidities  Goal: Patient's chronic conditions and co-morbidity symptoms are monitored and maintained or improved  10/4/2024 2221 by Silvia Keating RN  Outcome: Progressing  10/4/2024 1053 by Reva Garcia RN  Outcome: Progressing     Problem: Discharge Planning  Goal: Discharge to home or other facility with appropriate resources  10/4/2024 2221 by Silvia Keating RN  Outcome: Progressing  10/4/2024 1053 by Reva Garcia RN  Outcome: Progressing     Problem: Pain  Goal: Verbalizes/displays adequate comfort level or baseline comfort level  10/4/2024 2221 by Silvia Keating RN  Outcome: Progressing  10/4/2024 1053 by Reva Garcia RN  Outcome: Progressing     Problem: Skin/Tissue Integrity  Goal: Absence of new skin breakdown  Description: 1.  Monitor for areas of redness and/or skin breakdown  2.  Assess vascular access sites hourly  3.  Every 4-6 hours minimum:  Change oxygen saturation probe site  4.  Every 4-6 hours:  If on nasal continuous positive airway pressure, respiratory therapy assess nares and determine need for appliance change or resting period.  10/4/2024 2221 by Silvia Keating RN  Outcome: Progressing  10/4/2024 1053 by Reva aGrcia RN  Outcome: Progressing     Problem: Safety - Adult  Goal: Free from fall injury  10/4/2024 2221 by Silvia Keating RN  Outcome: Progressing  10/4/2024 1053 by Reva Garcia RN  Outcome: Progressing     Problem: ABCDS Injury Assessment  Goal: Absence of physical injury  10/4/2024 2221 by Silvia Keating RN  Outcome: Progressing  10/4/2024 1053 by Reva Garcia RN  Outcome: Progressing

## 2024-10-06 LAB
ANION GAP SERPL CALCULATED.3IONS-SCNC: 8 MMOL/L (ref 7–16)
BUN SERPL-MCNC: 26 MG/DL (ref 6–23)
CALCIUM SERPL-MCNC: 6.6 MG/DL (ref 8.6–10.2)
CHLORIDE SERPL-SCNC: 108 MMOL/L (ref 98–107)
CO2 SERPL-SCNC: 29 MMOL/L (ref 22–29)
CREAT SERPL-MCNC: 2.3 MG/DL (ref 0.7–1.2)
GFR, ESTIMATED: 28 ML/MIN/1.73M2
GLUCOSE BLD-MCNC: 106 MG/DL (ref 74–99)
GLUCOSE BLD-MCNC: 114 MG/DL (ref 74–99)
GLUCOSE BLD-MCNC: 119 MG/DL (ref 74–99)
GLUCOSE SERPL-MCNC: 120 MG/DL (ref 74–99)
HCT VFR BLD AUTO: 19.9 % (ref 37–54)
HCT VFR BLD AUTO: 25 % (ref 37–54)
HCT VFR BLD AUTO: 26.3 % (ref 37–54)
HGB BLD-MCNC: 6.7 G/DL (ref 12.5–16.5)
HGB BLD-MCNC: 8.4 G/DL (ref 12.5–16.5)
HGB BLD-MCNC: 8.7 G/DL (ref 12.5–16.5)
LACTATE BLDV-SCNC: 1 MMOL/L (ref 0.5–2.2)
LACTATE BLDV-SCNC: 1.5 MMOL/L (ref 0.5–2.2)
LACTATE BLDV-SCNC: 2.3 MMOL/L (ref 0.5–2.2)
MAGNESIUM SERPL-MCNC: 2 MG/DL (ref 1.6–2.6)
PHOSPHATE SERPL-MCNC: 2.6 MG/DL (ref 2.5–4.5)
POTASSIUM SERPL-SCNC: 2.8 MMOL/L (ref 3.5–5)
SODIUM SERPL-SCNC: 145 MMOL/L (ref 132–146)

## 2024-10-06 PROCEDURE — 84100 ASSAY OF PHOSPHORUS: CPT

## 2024-10-06 PROCEDURE — 2580000003 HC RX 258: Performed by: HOSPITALIST

## 2024-10-06 PROCEDURE — 36415 COLL VENOUS BLD VENIPUNCTURE: CPT

## 2024-10-06 PROCEDURE — 6360000002 HC RX W HCPCS: Performed by: INTERNAL MEDICINE

## 2024-10-06 PROCEDURE — 6370000000 HC RX 637 (ALT 250 FOR IP): Performed by: INTERNAL MEDICINE

## 2024-10-06 PROCEDURE — 83735 ASSAY OF MAGNESIUM: CPT

## 2024-10-06 PROCEDURE — 36430 TRANSFUSION BLD/BLD COMPNT: CPT

## 2024-10-06 PROCEDURE — P9016 RBC LEUKOCYTES REDUCED: HCPCS

## 2024-10-06 PROCEDURE — 6360000002 HC RX W HCPCS

## 2024-10-06 PROCEDURE — 82962 GLUCOSE BLOOD TEST: CPT

## 2024-10-06 PROCEDURE — 85018 HEMOGLOBIN: CPT

## 2024-10-06 PROCEDURE — 80048 BASIC METABOLIC PNL TOTAL CA: CPT

## 2024-10-06 PROCEDURE — 2580000003 HC RX 258: Performed by: NURSE PRACTITIONER

## 2024-10-06 PROCEDURE — 6370000000 HC RX 637 (ALT 250 FOR IP): Performed by: HOSPITALIST

## 2024-10-06 PROCEDURE — 83605 ASSAY OF LACTIC ACID: CPT

## 2024-10-06 PROCEDURE — 2580000003 HC RX 258: Performed by: INTERNAL MEDICINE

## 2024-10-06 PROCEDURE — 2580000003 HC RX 258

## 2024-10-06 PROCEDURE — 2060000000 HC ICU INTERMEDIATE R&B

## 2024-10-06 PROCEDURE — 85014 HEMATOCRIT: CPT

## 2024-10-06 RX ORDER — SODIUM CHLORIDE 9 MG/ML
INJECTION, SOLUTION INTRAVENOUS PRN
Status: DISCONTINUED | OUTPATIENT
Start: 2024-10-06 | End: 2024-10-15 | Stop reason: HOSPADM

## 2024-10-06 RX ORDER — DEXTROSE MONOHYDRATE AND SODIUM CHLORIDE 5; .45 G/100ML; G/100ML
INJECTION, SOLUTION INTRAVENOUS CONTINUOUS
Status: DISCONTINUED | OUTPATIENT
Start: 2024-10-06 | End: 2024-10-07

## 2024-10-06 RX ORDER — POTASSIUM CHLORIDE 1500 MG/1
40 TABLET, EXTENDED RELEASE ORAL EVERY 8 HOURS
Status: COMPLETED | OUTPATIENT
Start: 2024-10-06 | End: 2024-10-06

## 2024-10-06 RX ADMIN — POTASSIUM CHLORIDE 40 MEQ: 1500 TABLET, EXTENDED RELEASE ORAL at 05:10

## 2024-10-06 RX ADMIN — MONTELUKAST 10 MG: 10 TABLET, FILM COATED ORAL at 09:55

## 2024-10-06 RX ADMIN — SODIUM CHLORIDE 125 MG: 900 INJECTION INTRAVENOUS at 10:35

## 2024-10-06 RX ADMIN — POTASSIUM CHLORIDE 40 MEQ: 1500 TABLET, EXTENDED RELEASE ORAL at 12:57

## 2024-10-06 RX ADMIN — MIDODRINE HYDROCHLORIDE 5 MG: 5 TABLET ORAL at 09:55

## 2024-10-06 RX ADMIN — MIDODRINE HYDROCHLORIDE 5 MG: 5 TABLET ORAL at 16:49

## 2024-10-06 RX ADMIN — LEVOFLOXACIN 750 MG: 750 INJECTION, SOLUTION INTRAVENOUS at 16:49

## 2024-10-06 RX ADMIN — SODIUM CHLORIDE, PRESERVATIVE FREE 10 ML: 5 INJECTION INTRAVENOUS at 09:55

## 2024-10-06 RX ADMIN — SODIUM CHLORIDE, PRESERVATIVE FREE 10 ML: 5 INJECTION INTRAVENOUS at 22:07

## 2024-10-06 RX ADMIN — PANTOPRAZOLE SODIUM 40 MG: 40 INJECTION, POWDER, FOR SOLUTION INTRAVENOUS at 09:55

## 2024-10-06 RX ADMIN — ARIPIPRAZOLE 2 MG: 2 TABLET ORAL at 09:55

## 2024-10-06 RX ADMIN — CETIRIZINE HYDROCHLORIDE 10 MG: 10 TABLET, FILM COATED ORAL at 09:55

## 2024-10-06 RX ADMIN — HYDROCORTISONE 2.5%: 25 CREAM TOPICAL at 22:09

## 2024-10-06 RX ADMIN — HYDROCORTISONE 2.5%: 25 CREAM TOPICAL at 09:55

## 2024-10-06 RX ADMIN — FINASTERIDE 5 MG: 5 TABLET, FILM COATED ORAL at 22:07

## 2024-10-06 RX ADMIN — LEVOTHYROXINE SODIUM 75 MCG: 75 TABLET ORAL at 05:10

## 2024-10-06 RX ADMIN — DULOXETINE HYDROCHLORIDE 60 MG: 60 CAPSULE, DELAYED RELEASE ORAL at 16:50

## 2024-10-06 RX ADMIN — DULOXETINE HYDROCHLORIDE 60 MG: 60 CAPSULE, DELAYED RELEASE ORAL at 09:55

## 2024-10-06 RX ADMIN — MIDODRINE HYDROCHLORIDE 5 MG: 5 TABLET ORAL at 12:57

## 2024-10-06 RX ADMIN — PANTOPRAZOLE SODIUM 40 MG: 40 INJECTION, POWDER, FOR SOLUTION INTRAVENOUS at 22:07

## 2024-10-06 RX ADMIN — METRONIDAZOLE 500 MG: 500 INJECTION, SOLUTION INTRAVENOUS at 10:01

## 2024-10-06 RX ADMIN — CLONAZEPAM 0.5 MG: 0.5 TABLET ORAL at 22:07

## 2024-10-06 RX ADMIN — SODIUM CHLORIDE: 9 INJECTION, SOLUTION INTRAVENOUS at 14:47

## 2024-10-06 RX ADMIN — CLONAZEPAM 0.5 MG: 0.5 TABLET ORAL at 09:55

## 2024-10-06 RX ADMIN — METRONIDAZOLE 500 MG: 500 INJECTION, SOLUTION INTRAVENOUS at 14:50

## 2024-10-06 RX ADMIN — DEXTROSE AND SODIUM CHLORIDE: 5; 450 INJECTION, SOLUTION INTRAVENOUS at 17:52

## 2024-10-07 ENCOUNTER — APPOINTMENT (OUTPATIENT)
Dept: GENERAL RADIOLOGY | Age: 74
DRG: 378 | End: 2024-10-07
Payer: MEDICARE

## 2024-10-07 LAB
ABO/RH: NORMAL
ALBUMIN SERPL-MCNC: 2.6 G/DL (ref 3.5–5.2)
ALP SERPL-CCNC: 38 U/L (ref 40–129)
ALT SERPL-CCNC: 6 U/L (ref 0–40)
ANION GAP SERPL CALCULATED.3IONS-SCNC: 11 MMOL/L (ref 7–16)
ANTIBODY SCREEN: NEGATIVE
ARM BAND NUMBER: NORMAL
AST SERPL-CCNC: 16 U/L (ref 0–39)
BILIRUB SERPL-MCNC: 0.3 MG/DL (ref 0–1.2)
BLOOD BANK BLOOD PRODUCT EXPIRATION DATE: NORMAL
BLOOD BANK DISPENSE STATUS: NORMAL
BLOOD BANK ISBT PRODUCT BLOOD TYPE: 9500
BLOOD BANK PRODUCT CODE: NORMAL
BLOOD BANK SAMPLE EXPIRATION: NORMAL
BLOOD BANK UNIT TYPE AND RH: NORMAL
BPU ID: NORMAL
BUN SERPL-MCNC: 22 MG/DL (ref 6–23)
CALCIUM SERPL-MCNC: 6.7 MG/DL (ref 8.6–10.2)
CHLORIDE SERPL-SCNC: 109 MMOL/L (ref 98–107)
CO2 SERPL-SCNC: 23 MMOL/L (ref 22–29)
COMPONENT: NORMAL
CREAT SERPL-MCNC: 2.1 MG/DL (ref 0.7–1.2)
CROSSMATCH RESULT: NORMAL
ERYTHROCYTE [DISTWIDTH] IN BLOOD BY AUTOMATED COUNT: 16.7 % (ref 11.5–15)
GFR, ESTIMATED: 33 ML/MIN/1.73M2
GLUCOSE BLD-MCNC: 102 MG/DL (ref 74–99)
GLUCOSE BLD-MCNC: 119 MG/DL (ref 74–99)
GLUCOSE BLD-MCNC: 126 MG/DL (ref 74–99)
GLUCOSE BLD-MCNC: 94 MG/DL (ref 74–99)
GLUCOSE SERPL-MCNC: 120 MG/DL (ref 74–99)
HCT VFR BLD AUTO: 23.5 % (ref 37–54)
HCT VFR BLD AUTO: 25.3 % (ref 37–54)
HCT VFR BLD AUTO: 26.6 % (ref 37–54)
HCT VFR BLD AUTO: 26.9 % (ref 37–54)
HCT VFR BLD AUTO: 29.9 % (ref 37–54)
HGB BLD-MCNC: 7.8 G/DL (ref 12.5–16.5)
HGB BLD-MCNC: 8.2 G/DL (ref 12.5–16.5)
HGB BLD-MCNC: 8.5 G/DL (ref 12.5–16.5)
HGB BLD-MCNC: 8.9 G/DL (ref 12.5–16.5)
HGB BLD-MCNC: 9.8 G/DL (ref 12.5–16.5)
LACTATE BLDV-SCNC: 0.8 MMOL/L (ref 0.5–2.2)
LACTATE BLDV-SCNC: 2.2 MMOL/L (ref 0.5–2.2)
LACTATE BLDV-SCNC: 3.7 MMOL/L (ref 0.5–2.2)
MAGNESIUM SERPL-MCNC: 1.9 MG/DL (ref 1.6–2.6)
MCH RBC QN AUTO: 31.3 PG (ref 26–35)
MCHC RBC AUTO-ENTMCNC: 32.4 G/DL (ref 32–34.5)
MCV RBC AUTO: 96.6 FL (ref 80–99.9)
PHOSPHATE SERPL-MCNC: 2.4 MG/DL (ref 2.5–4.5)
PLATELET # BLD AUTO: 175 K/UL (ref 130–450)
PMV BLD AUTO: 11.7 FL (ref 7–12)
POTASSIUM SERPL-SCNC: 3 MMOL/L (ref 3.5–5)
PROT SERPL-MCNC: 4.7 G/DL (ref 6.4–8.3)
RBC # BLD AUTO: 2.62 M/UL (ref 3.8–5.8)
SODIUM SERPL-SCNC: 143 MMOL/L (ref 132–146)
TRANSFUSION STATUS: NORMAL
UNIT DIVISION: 0
UNIT ISSUE DATE/TIME: NORMAL
UNIT TAG COMMENT: NORMAL
UNIT TAG COMMENT: NORMAL
WBC OTHER # BLD: 15.7 K/UL (ref 4.5–11.5)

## 2024-10-07 PROCEDURE — 83605 ASSAY OF LACTIC ACID: CPT

## 2024-10-07 PROCEDURE — 82962 GLUCOSE BLOOD TEST: CPT

## 2024-10-07 PROCEDURE — 84100 ASSAY OF PHOSPHORUS: CPT

## 2024-10-07 PROCEDURE — 85018 HEMOGLOBIN: CPT

## 2024-10-07 PROCEDURE — 80053 COMPREHEN METABOLIC PANEL: CPT

## 2024-10-07 PROCEDURE — 2580000003 HC RX 258

## 2024-10-07 PROCEDURE — 6370000000 HC RX 637 (ALT 250 FOR IP): Performed by: NURSE PRACTITIONER

## 2024-10-07 PROCEDURE — 6370000000 HC RX 637 (ALT 250 FOR IP): Performed by: HOSPITALIST

## 2024-10-07 PROCEDURE — 6370000000 HC RX 637 (ALT 250 FOR IP)

## 2024-10-07 PROCEDURE — 6370000000 HC RX 637 (ALT 250 FOR IP): Performed by: INTERNAL MEDICINE

## 2024-10-07 PROCEDURE — 2700000000 HC OXYGEN THERAPY PER DAY

## 2024-10-07 PROCEDURE — 85014 HEMATOCRIT: CPT

## 2024-10-07 PROCEDURE — 6360000002 HC RX W HCPCS: Performed by: INTERNAL MEDICINE

## 2024-10-07 PROCEDURE — 71045 X-RAY EXAM CHEST 1 VIEW: CPT

## 2024-10-07 PROCEDURE — 2580000003 HC RX 258: Performed by: HOSPITALIST

## 2024-10-07 PROCEDURE — 83735 ASSAY OF MAGNESIUM: CPT

## 2024-10-07 PROCEDURE — 6360000002 HC RX W HCPCS

## 2024-10-07 PROCEDURE — 2580000003 HC RX 258: Performed by: INTERNAL MEDICINE

## 2024-10-07 PROCEDURE — 2060000000 HC ICU INTERMEDIATE R&B

## 2024-10-07 PROCEDURE — 85027 COMPLETE CBC AUTOMATED: CPT

## 2024-10-07 RX ORDER — POTASSIUM CHLORIDE 1500 MG/1
40 TABLET, EXTENDED RELEASE ORAL EVERY 8 HOURS
Status: COMPLETED | OUTPATIENT
Start: 2024-10-07 | End: 2024-10-07

## 2024-10-07 RX ORDER — SODIUM CHLORIDE, SODIUM LACTATE, POTASSIUM CHLORIDE, CALCIUM CHLORIDE 600; 310; 30; 20 MG/100ML; MG/100ML; MG/100ML; MG/100ML
INJECTION, SOLUTION INTRAVENOUS CONTINUOUS
Status: DISCONTINUED | OUTPATIENT
Start: 2024-10-07 | End: 2024-10-09

## 2024-10-07 RX ORDER — BISACODYL 5 MG/1
30 TABLET, DELAYED RELEASE ORAL ONCE
Status: COMPLETED | OUTPATIENT
Start: 2024-10-07 | End: 2024-10-07

## 2024-10-07 RX ADMIN — METRONIDAZOLE 500 MG: 500 INJECTION, SOLUTION INTRAVENOUS at 15:41

## 2024-10-07 RX ADMIN — ARIPIPRAZOLE 2 MG: 2 TABLET ORAL at 07:59

## 2024-10-07 RX ADMIN — CLONAZEPAM 0.5 MG: 0.5 TABLET ORAL at 07:59

## 2024-10-07 RX ADMIN — METRONIDAZOLE 500 MG: 500 INJECTION, SOLUTION INTRAVENOUS at 00:30

## 2024-10-07 RX ADMIN — CLONAZEPAM 0.5 MG: 0.5 TABLET ORAL at 20:53

## 2024-10-07 RX ADMIN — DULOXETINE HYDROCHLORIDE 60 MG: 60 CAPSULE, DELAYED RELEASE ORAL at 07:59

## 2024-10-07 RX ADMIN — CETIRIZINE HYDROCHLORIDE 10 MG: 10 TABLET, FILM COATED ORAL at 07:59

## 2024-10-07 RX ADMIN — POLYETHYLENE GLYCOL-3350 AND ELECTROLYTES 500 ML: 236; 6.74; 5.86; 2.97; 22.74 POWDER, FOR SOLUTION ORAL at 11:50

## 2024-10-07 RX ADMIN — SODIUM CHLORIDE, POTASSIUM CHLORIDE, SODIUM LACTATE AND CALCIUM CHLORIDE: 600; 310; 30; 20 INJECTION, SOLUTION INTRAVENOUS at 18:57

## 2024-10-07 RX ADMIN — FINASTERIDE 5 MG: 5 TABLET, FILM COATED ORAL at 20:53

## 2024-10-07 RX ADMIN — SODIUM CHLORIDE, PRESERVATIVE FREE 10 ML: 5 INJECTION INTRAVENOUS at 07:59

## 2024-10-07 RX ADMIN — MIDODRINE HYDROCHLORIDE 5 MG: 5 TABLET ORAL at 07:59

## 2024-10-07 RX ADMIN — HYDROCORTISONE 2.5%: 25 CREAM TOPICAL at 20:55

## 2024-10-07 RX ADMIN — BISACODYL 30 MG: 5 TABLET, COATED ORAL at 15:38

## 2024-10-07 RX ADMIN — LEVOTHYROXINE SODIUM 75 MCG: 75 TABLET ORAL at 05:28

## 2024-10-07 RX ADMIN — DEXTROSE AND SODIUM CHLORIDE: 5; 450 INJECTION, SOLUTION INTRAVENOUS at 06:03

## 2024-10-07 RX ADMIN — MIDODRINE HYDROCHLORIDE 5 MG: 5 TABLET ORAL at 10:49

## 2024-10-07 RX ADMIN — MIDODRINE HYDROCHLORIDE 5 MG: 5 TABLET ORAL at 15:37

## 2024-10-07 RX ADMIN — METRONIDAZOLE 500 MG: 500 INJECTION, SOLUTION INTRAVENOUS at 08:13

## 2024-10-07 RX ADMIN — SODIUM CHLORIDE, POTASSIUM CHLORIDE, SODIUM LACTATE AND CALCIUM CHLORIDE: 600; 310; 30; 20 INJECTION, SOLUTION INTRAVENOUS at 12:01

## 2024-10-07 RX ADMIN — SODIUM CHLORIDE, PRESERVATIVE FREE 10 ML: 5 INJECTION INTRAVENOUS at 20:54

## 2024-10-07 RX ADMIN — PANTOPRAZOLE SODIUM 40 MG: 40 INJECTION, POWDER, FOR SOLUTION INTRAVENOUS at 20:54

## 2024-10-07 RX ADMIN — HYDROCORTISONE 2.5%: 25 CREAM TOPICAL at 07:59

## 2024-10-07 RX ADMIN — POTASSIUM CHLORIDE 40 MEQ: 1500 TABLET, EXTENDED RELEASE ORAL at 07:59

## 2024-10-07 RX ADMIN — POTASSIUM CHLORIDE 40 MEQ: 1500 TABLET, EXTENDED RELEASE ORAL at 15:37

## 2024-10-07 RX ADMIN — ACETAMINOPHEN 650 MG: 325 TABLET ORAL at 07:59

## 2024-10-07 RX ADMIN — PETROLATUM: 420 OINTMENT TOPICAL at 08:10

## 2024-10-07 RX ADMIN — PANTOPRAZOLE SODIUM 40 MG: 40 INJECTION, POWDER, FOR SOLUTION INTRAVENOUS at 07:59

## 2024-10-07 RX ADMIN — DULOXETINE HYDROCHLORIDE 60 MG: 60 CAPSULE, DELAYED RELEASE ORAL at 15:37

## 2024-10-07 RX ADMIN — PETROLATUM: 420 OINTMENT TOPICAL at 20:45

## 2024-10-07 RX ADMIN — SODIUM CHLORIDE 125 MG: 900 INJECTION INTRAVENOUS at 09:12

## 2024-10-07 RX ADMIN — MONTELUKAST 10 MG: 10 TABLET, FILM COATED ORAL at 07:59

## 2024-10-07 ASSESSMENT — PAIN SCALES - GENERAL
PAINLEVEL_OUTOF10: 0
PAINLEVEL_OUTOF10: 3

## 2024-10-07 ASSESSMENT — PAIN DESCRIPTION - DESCRIPTORS: DESCRIPTORS: ACHING;DISCOMFORT;SORE

## 2024-10-07 ASSESSMENT — PAIN DESCRIPTION - LOCATION: LOCATION: BACK

## 2024-10-07 ASSESSMENT — PAIN DESCRIPTION - ORIENTATION: ORIENTATION: LEFT

## 2024-10-07 NOTE — PLAN OF CARE
Problem: Chronic Conditions and Co-morbidities  Goal: Patient's chronic conditions and co-morbidity symptoms are monitored and maintained or improved  10/7/2024 1213 by Elsy Starks RN  Outcome: Progressing     Problem: Discharge Planning  Goal: Discharge to home or other facility with appropriate resources  10/7/2024 1213 by Elsy Starks RN  Outcome: Progressing     Problem: Pain  Goal: Verbalizes/displays adequate comfort level or baseline comfort level  10/7/2024 1213 by Elsy Starks RN  Outcome: Progressing     Problem: Skin/Tissue Integrity  Goal: Absence of new skin breakdown  Description: 1.  Monitor for areas of redness and/or skin breakdown  2.  Assess vascular access sites hourly  3.  Every 4-6 hours minimum:  Change oxygen saturation probe site  4.  Every 4-6 hours:  If on nasal continuous positive airway pressure, respiratory therapy assess nares and determine need for appliance change or resting period.  10/7/2024 1213 by Elsy Starks RN  Outcome: Progressing     Problem: Safety - Adult  Goal: Free from fall injury  10/7/2024 1213 by Elsy Starks RN  Outcome: Progressing     Problem: ABCDS Injury Assessment  Goal: Absence of physical injury  10/7/2024 1213 by Elsy Starks RN  Outcome: Progressing

## 2024-10-08 ENCOUNTER — ANESTHESIA EVENT (OUTPATIENT)
Dept: ENDOSCOPY | Age: 74
End: 2024-10-08
Payer: MEDICARE

## 2024-10-08 ENCOUNTER — ANESTHESIA (OUTPATIENT)
Dept: ENDOSCOPY | Age: 74
End: 2024-10-08
Payer: MEDICARE

## 2024-10-08 LAB
ANION GAP SERPL CALCULATED.3IONS-SCNC: 13 MMOL/L (ref 7–16)
ANION GAP SERPL CALCULATED.3IONS-SCNC: 15 MMOL/L (ref 7–16)
BUN SERPL-MCNC: 18 MG/DL (ref 6–23)
BUN SERPL-MCNC: 19 MG/DL (ref 6–23)
CALCIUM SERPL-MCNC: 7.1 MG/DL (ref 8.6–10.2)
CALCIUM SERPL-MCNC: 7.6 MG/DL (ref 8.6–10.2)
CHLORIDE SERPL-SCNC: 111 MMOL/L (ref 98–107)
CHLORIDE SERPL-SCNC: 112 MMOL/L (ref 98–107)
CO2 SERPL-SCNC: 16 MMOL/L (ref 22–29)
CO2 SERPL-SCNC: 21 MMOL/L (ref 22–29)
CREAT SERPL-MCNC: 1.8 MG/DL (ref 0.7–1.2)
CREAT SERPL-MCNC: 2.1 MG/DL (ref 0.7–1.2)
GFR, ESTIMATED: 32 ML/MIN/1.73M2
GFR, ESTIMATED: 39 ML/MIN/1.73M2
GLUCOSE BLD-MCNC: 83 MG/DL (ref 74–99)
GLUCOSE BLD-MCNC: 90 MG/DL (ref 74–99)
GLUCOSE BLD-MCNC: 92 MG/DL (ref 74–99)
GLUCOSE BLD-MCNC: 98 MG/DL (ref 74–99)
GLUCOSE SERPL-MCNC: 82 MG/DL (ref 74–99)
GLUCOSE SERPL-MCNC: 96 MG/DL (ref 74–99)
HCT VFR BLD AUTO: 25.8 % (ref 37–54)
HGB BLD-MCNC: 8.3 G/DL (ref 12.5–16.5)
MAGNESIUM SERPL-MCNC: 1.8 MG/DL (ref 1.6–2.6)
POTASSIUM SERPL-SCNC: 3.1 MMOL/L (ref 3.5–5)
POTASSIUM SERPL-SCNC: 3.4 MMOL/L (ref 3.5–5)
SODIUM SERPL-SCNC: 143 MMOL/L (ref 132–146)
SODIUM SERPL-SCNC: 145 MMOL/L (ref 132–146)

## 2024-10-08 PROCEDURE — 6360000002 HC RX W HCPCS: Performed by: INTERNAL MEDICINE

## 2024-10-08 PROCEDURE — 6360000002 HC RX W HCPCS

## 2024-10-08 PROCEDURE — 2580000003 HC RX 258: Performed by: HOSPITALIST

## 2024-10-08 PROCEDURE — 97535 SELF CARE MNGMENT TRAINING: CPT

## 2024-10-08 PROCEDURE — 2700000000 HC OXYGEN THERAPY PER DAY

## 2024-10-08 PROCEDURE — 97530 THERAPEUTIC ACTIVITIES: CPT

## 2024-10-08 PROCEDURE — 2580000003 HC RX 258

## 2024-10-08 PROCEDURE — 0DJD8ZZ INSPECTION OF LOWER INTESTINAL TRACT, VIA NATURAL OR ARTIFICIAL OPENING ENDOSCOPIC: ICD-10-PCS | Performed by: INTERNAL MEDICINE

## 2024-10-08 PROCEDURE — 2709999900 HC NON-CHARGEABLE SUPPLY: Performed by: INTERNAL MEDICINE

## 2024-10-08 PROCEDURE — 6370000000 HC RX 637 (ALT 250 FOR IP): Performed by: HOSPITALIST

## 2024-10-08 PROCEDURE — 7100000000 HC PACU RECOVERY - FIRST 15 MIN: Performed by: INTERNAL MEDICINE

## 2024-10-08 PROCEDURE — 7100000001 HC PACU RECOVERY - ADDTL 15 MIN: Performed by: INTERNAL MEDICINE

## 2024-10-08 PROCEDURE — 6360000002 HC RX W HCPCS: Performed by: NURSE PRACTITIONER

## 2024-10-08 PROCEDURE — 82962 GLUCOSE BLOOD TEST: CPT

## 2024-10-08 PROCEDURE — 2060000000 HC ICU INTERMEDIATE R&B

## 2024-10-08 PROCEDURE — 3700000000 HC ANESTHESIA ATTENDED CARE: Performed by: INTERNAL MEDICINE

## 2024-10-08 PROCEDURE — 3609027000 HC COLONOSCOPY: Performed by: INTERNAL MEDICINE

## 2024-10-08 PROCEDURE — 2580000003 HC RX 258: Performed by: INTERNAL MEDICINE

## 2024-10-08 PROCEDURE — 80048 BASIC METABOLIC PNL TOTAL CA: CPT

## 2024-10-08 PROCEDURE — 85018 HEMOGLOBIN: CPT

## 2024-10-08 PROCEDURE — 3700000001 HC ADD 15 MINUTES (ANESTHESIA): Performed by: INTERNAL MEDICINE

## 2024-10-08 PROCEDURE — 6360000002 HC RX W HCPCS: Performed by: NURSE ANESTHETIST, CERTIFIED REGISTERED

## 2024-10-08 PROCEDURE — 83735 ASSAY OF MAGNESIUM: CPT

## 2024-10-08 PROCEDURE — 85014 HEMATOCRIT: CPT

## 2024-10-08 RX ORDER — SODIUM CHLORIDE 9 MG/ML
INJECTION, SOLUTION INTRAVENOUS
Status: DISCONTINUED | OUTPATIENT
Start: 2024-10-08 | End: 2024-10-08 | Stop reason: SDUPTHER

## 2024-10-08 RX ORDER — PROPOFOL 10 MG/ML
INJECTION, EMULSION INTRAVENOUS
Status: DISCONTINUED | OUTPATIENT
Start: 2024-10-08 | End: 2024-10-08 | Stop reason: SDUPTHER

## 2024-10-08 RX ORDER — POTASSIUM CHLORIDE 7.45 MG/ML
10 INJECTION INTRAVENOUS
Status: COMPLETED | OUTPATIENT
Start: 2024-10-08 | End: 2024-10-08

## 2024-10-08 RX ADMIN — POTASSIUM CHLORIDE 10 MEQ: 7.46 INJECTION, SOLUTION INTRAVENOUS at 11:28

## 2024-10-08 RX ADMIN — PROPOFOL 150 MG: 10 INJECTION, EMULSION INTRAVENOUS at 15:05

## 2024-10-08 RX ADMIN — METRONIDAZOLE 500 MG: 500 INJECTION, SOLUTION INTRAVENOUS at 08:05

## 2024-10-08 RX ADMIN — FINASTERIDE 5 MG: 5 TABLET, FILM COATED ORAL at 20:07

## 2024-10-08 RX ADMIN — PANTOPRAZOLE SODIUM 40 MG: 40 INJECTION, POWDER, FOR SOLUTION INTRAVENOUS at 20:07

## 2024-10-08 RX ADMIN — DULOXETINE HYDROCHLORIDE 60 MG: 60 CAPSULE, DELAYED RELEASE ORAL at 16:22

## 2024-10-08 RX ADMIN — HYDROCORTISONE 2.5%: 25 CREAM TOPICAL at 08:02

## 2024-10-08 RX ADMIN — CETIRIZINE HYDROCHLORIDE 10 MG: 10 TABLET, FILM COATED ORAL at 08:02

## 2024-10-08 RX ADMIN — LEVOFLOXACIN 750 MG: 750 INJECTION, SOLUTION INTRAVENOUS at 16:30

## 2024-10-08 RX ADMIN — METRONIDAZOLE 500 MG: 500 INJECTION, SOLUTION INTRAVENOUS at 16:28

## 2024-10-08 RX ADMIN — CLONAZEPAM 0.5 MG: 0.5 TABLET ORAL at 08:01

## 2024-10-08 RX ADMIN — MONTELUKAST 10 MG: 10 TABLET, FILM COATED ORAL at 08:02

## 2024-10-08 RX ADMIN — PANTOPRAZOLE SODIUM 40 MG: 40 INJECTION, POWDER, FOR SOLUTION INTRAVENOUS at 08:02

## 2024-10-08 RX ADMIN — METRONIDAZOLE 500 MG: 500 INJECTION, SOLUTION INTRAVENOUS at 00:13

## 2024-10-08 RX ADMIN — SODIUM CHLORIDE 125 MG: 900 INJECTION INTRAVENOUS at 08:06

## 2024-10-08 RX ADMIN — PETROLATUM: 420 OINTMENT TOPICAL at 20:11

## 2024-10-08 RX ADMIN — HYDROCORTISONE 2.5%: 25 CREAM TOPICAL at 20:07

## 2024-10-08 RX ADMIN — ARIPIPRAZOLE 2 MG: 2 TABLET ORAL at 08:02

## 2024-10-08 RX ADMIN — SODIUM CHLORIDE, POTASSIUM CHLORIDE, SODIUM LACTATE AND CALCIUM CHLORIDE: 600; 310; 30; 20 INJECTION, SOLUTION INTRAVENOUS at 17:50

## 2024-10-08 RX ADMIN — MIDODRINE HYDROCHLORIDE 5 MG: 5 TABLET ORAL at 08:02

## 2024-10-08 RX ADMIN — POTASSIUM CHLORIDE 10 MEQ: 7.46 INJECTION, SOLUTION INTRAVENOUS at 10:21

## 2024-10-08 RX ADMIN — DULOXETINE HYDROCHLORIDE 60 MG: 60 CAPSULE, DELAYED RELEASE ORAL at 08:02

## 2024-10-08 RX ADMIN — SODIUM CHLORIDE: 9 INJECTION, SOLUTION INTRAVENOUS at 15:00

## 2024-10-08 RX ADMIN — POTASSIUM CHLORIDE 10 MEQ: 7.46 INJECTION, SOLUTION INTRAVENOUS at 09:15

## 2024-10-08 RX ADMIN — SODIUM CHLORIDE, PRESERVATIVE FREE 10 ML: 5 INJECTION INTRAVENOUS at 20:10

## 2024-10-08 RX ADMIN — SODIUM CHLORIDE, PRESERVATIVE FREE 10 ML: 5 INJECTION INTRAVENOUS at 08:02

## 2024-10-08 RX ADMIN — CLONAZEPAM 0.5 MG: 0.5 TABLET ORAL at 20:07

## 2024-10-08 RX ADMIN — POTASSIUM CHLORIDE 10 MEQ: 7.46 INJECTION, SOLUTION INTRAVENOUS at 12:15

## 2024-10-08 ASSESSMENT — PAIN SCALES - GENERAL
PAINLEVEL_OUTOF10: 0

## 2024-10-08 NOTE — ANESTHESIA POSTPROCEDURE EVALUATION
Department of Anesthesiology  Postprocedure Note    Patient: Anuel Kwok  MRN: [id number]  YOB: 1950  Date of evaluation: 10/8/2024    Procedure Summary       Date: 10/08/24 Room / Location: [place] [id number] / [facility]    Anesthesia Start: 1500 Anesthesia Stop: 1525    Procedure: COLONOSCOPY DIAGNOSTIC Diagnosis:       Anemia, unspecified type      (Anemia, unspecified type [D64.9])    Surgeons: Brian Barkley DO Responsible Provider: Patricia Armstrong DO    Anesthesia Type: MAC ASA Status: 3            Anesthesia Type: No value filed.    [place] Phase I:      Corona Phase II:      Anesthesia Post Evaluation    Patient location during evaluation: bedside  Patient participation: complete - patient participated  Level of consciousness: awake and alert  Pain score: 0  Airway patency: patent  Nausea & Vomiting: no nausea and no vomiting  Cardiovascular status: blood pressure returned to baseline  Respiratory status: acceptable, nonlabored ventilation and spontaneous ventilation  Hydration status: euvolemic  Pain management: adequate        No notable events documented.

## 2024-10-08 NOTE — ANESTHESIA PRE PROCEDURE
Department of Anesthesiology  Preprocedure Note       Name:  Anuel Kwok   Age:  74 y.o.  :  1950                                          MRN:  [id number]         Date:  10/8/2024      Surgeon: Surgeon(s):  Brian Barkley DO    Procedure: Procedure(s):  COLONOSCOPY DIAGNOSTIC    Medications prior to admission:   Prior to Admission medications    Medication Sig Start Date End Date Taking? Authorizing Provider   ARIPiprazole (ABILIFY) 2 MG tablet Take 1 tablet by mouth every morning   Yes Provider, MD Peggy   dicyclomine (BENTYL) 10 MG capsule Take 1 capsule by mouth 2 times daily (with meals)   Yes Provider, MD Peggy   tamsulosin (FLOMAX) 0.4 MG capsule Take 1 capsule by mouth nightly   Yes Provider, MD Peggy   finasteride (PROSCAR) 5 MG tablet Take 1 tablet by mouth nightly   Yes Provider, MD Peggy   vitamin D (CHOLECALCIFEROL) 25 MCG (1000 UT) TABS tablet Take 1 tablet by mouth every morning   Yes Provider, MD Peggy   Ascorbic Acid (VITAMIN C) 1000 MG tablet Take 1 tablet by mouth Daily with supper   Yes Provider, MD Peggy   aspirin 81 MG tablet Take 1 tablet by mouth every morning   Yes Provider, MD Peggy   rivaroxaban (XARELTO) 20 MG TABS tablet Take 1 tablet by mouth Daily with supper   Yes Provider, MD Peggy   fenofibrate (TRICOR) 145 MG tablet Take 1 tablet by mouth daily @ 1600   Yes Provider, MD Peggy   DULoxetine (CYMBALTA) 60 MG capsule Take 1 capsule by mouth 2 times daily (with meals)   Yes Provider, MD Peggy   clonazePAM (KLONOPIN) 1 MG tablet Take 1 tablet by mouth 3 times daily.   Yes Provider, Historical, MD   levothyroxine (SYNTHROID) 75 MCG tablet Take 1 tablet by mouth every morning (before breakfast)   Yes Provider, MD Peggy   montelukast (SINGULAIR) 10 MG tablet Take 1 tablet by mouth every morning   Yes Provider, MD Peggy   desloratadine (CLARINEX) 5 MG tablet Take 1 tablet by mouth every morning   Yes

## 2024-10-08 NOTE — PLAN OF CARE
Problem: Chronic Conditions and Co-morbidities  Goal: Patient's chronic conditions and co-morbidity symptoms are monitored and maintained or improved  10/8/2024 1045 by Komal Alonzo RN  Outcome: Progressing     Problem: Discharge Planning  Goal: Discharge to home or other facility with appropriate resources  10/8/2024 1045 by Komal Alonzo RN  Outcome: Progressing     Problem: Pain  Goal: Verbalizes/displays adequate comfort level or baseline comfort level  10/8/2024 1045 by Komal Alonzo RN  Outcome: Progressing     Problem: Skin/Tissue Integrity  Goal: Absence of new skin breakdown  Description: 1.  Monitor for areas of redness and/or skin breakdown  2.  Assess vascular access sites hourly  3.  Every 4-6 hours minimum:  Change oxygen saturation probe site  4.  Every 4-6 hours:  If on nasal continuous positive airway pressure, respiratory therapy assess nares and determine need for appliance change or resting period.  10/8/2024 1045 by Komal Alonzo RN  Outcome: Progressing     Problem: Safety - Adult  Goal: Free from fall injury  10/8/2024 1045 by Komal Alonzo RN  Outcome: Progressing     Problem: ABCDS Injury Assessment  Goal: Absence of physical injury  10/8/2024 1045 by Komal Alonzo RN  Outcome: Progressing

## 2024-10-08 NOTE — PLAN OF CARE
Problem: Chronic Conditions and Co-morbidities  Goal: Patient's chronic conditions and co-morbidity symptoms are monitored and maintained or improved  10/7/2024 2240 by Cheryl Antonio, RN  Outcome: Progressing     Problem: Discharge Planning  Goal: Discharge to home or other facility with appropriate resources  10/7/2024 2240 by Cheryl Antonio, RN  Outcome: Progressing     Problem: Pain  Goal: Verbalizes/displays adequate comfort level or baseline comfort level  10/7/2024 2240 by Cheryl Anotnio, RN  Outcome: Progressing     Problem: Skin/Tissue Integrity  Goal: Absence of new skin breakdown  Description: 1.  Monitor for areas of redness and/or skin breakdown  2.  Assess vascular access sites hourly  3.  Every 4-6 hours minimum:  Change oxygen saturation probe site  4.  Every 4-6 hours:  If on nasal continuous positive airway pressure, respiratory therapy assess nares and determine need for appliance change or resting period.  10/7/2024 2240 by Cheryl Antonio, RN  Outcome: Progressing     Problem: Safety - Adult  Goal: Free from fall injury  10/7/2024 2240 by Cheryl Antonio, RN  Outcome: Progressing     Problem: ABCDS Injury Assessment  Goal: Absence of physical injury  10/7/2024 2240 by Cheryl Antonio, RN  Outcome: Progressing

## 2024-10-08 NOTE — PROCEDURES
Colonoscopy Note      Indication for procedure:   Acute blood loss anemia    Preparation:   EKG, pulse, pulse oximetry, and blood pressure were monitored throughout the exam    Sedation  MAC    Rectal exam:  Normal rectal exam    Procedure: The endoscope was passed through anus under direct visualization and was advanced with ease to koko terminal ileum. The scope was withdrawn and mucosa was carefully examined. Bowel preparation is good to fair.  Patient tolerated procedure well.    Findings:  Koko terminal ileum and anastomoses were normal, few sutures noted, appeared S-E   Ascending colon is normal   Transverse colon is normal  Descending colon is normal  Sigmoid colon have few small widely scattered diverticula  Rectum direct views are normal  Retroflexion in rectum shows normal mucosa and dentate line    EBL - minimal    IMPRESSION AND PLAN:   1.  Normal colonoscopy with minimal diverticulosis and prior ileocecectomy (S-E). No fresh or old blood throughout entire exam. Good to fair colonoscopy prep.    Patient is ok to resume medications, (Xarelto tomorrow). Suspect diverticular etiology which has resolved. Patient is ok for a diet. Recommend outpt follow-up. Case was discussed with his wife Alvina.     Brian Barkley,   10/8/2024

## 2024-10-08 NOTE — CARE COORDINATION
Social Work/Discharge Planning:  Met with patient and his wife Alvina and reviewed therapy notes indicating need for rehab.  Patient and his wife denies need for rehab at a skilled nursing facility.  Discussed option for home health care and they decline need for it at this time.  Informed patient and his wife to follow up with his Primary Care Physician if needed at discharge.  Patient to have a colonoscopy today.  Will continue to follow.  Electronically signed by CHATO Yuan on 10/8/2024 at 12:19 PM

## 2024-10-09 LAB
ANION GAP SERPL CALCULATED.3IONS-SCNC: 14 MMOL/L (ref 7–16)
BUN SERPL-MCNC: 19 MG/DL (ref 6–23)
CALCIUM SERPL-MCNC: 7.9 MG/DL (ref 8.6–10.2)
CHLORIDE SERPL-SCNC: 112 MMOL/L (ref 98–107)
CO2 SERPL-SCNC: 18 MMOL/L (ref 22–29)
CREAT SERPL-MCNC: 1.9 MG/DL (ref 0.7–1.2)
GFR, ESTIMATED: 38 ML/MIN/1.73M2
GLUCOSE BLD-MCNC: 111 MG/DL (ref 74–99)
GLUCOSE BLD-MCNC: 114 MG/DL (ref 74–99)
GLUCOSE BLD-MCNC: 92 MG/DL (ref 74–99)
GLUCOSE BLD-MCNC: 93 MG/DL (ref 74–99)
GLUCOSE SERPL-MCNC: 95 MG/DL (ref 74–99)
HCT VFR BLD AUTO: 27.5 % (ref 37–54)
HGB BLD-MCNC: 8.8 G/DL (ref 12.5–16.5)
MAGNESIUM SERPL-MCNC: 1.7 MG/DL (ref 1.6–2.6)
POTASSIUM SERPL-SCNC: 3.1 MMOL/L (ref 3.5–5)
SODIUM SERPL-SCNC: 144 MMOL/L (ref 132–146)

## 2024-10-09 PROCEDURE — 85014 HEMATOCRIT: CPT

## 2024-10-09 PROCEDURE — 85018 HEMOGLOBIN: CPT

## 2024-10-09 PROCEDURE — 97530 THERAPEUTIC ACTIVITIES: CPT

## 2024-10-09 PROCEDURE — 2060000000 HC ICU INTERMEDIATE R&B

## 2024-10-09 PROCEDURE — 6370000000 HC RX 637 (ALT 250 FOR IP): Performed by: NURSE PRACTITIONER

## 2024-10-09 PROCEDURE — 83735 ASSAY OF MAGNESIUM: CPT

## 2024-10-09 PROCEDURE — 2500000003 HC RX 250 WO HCPCS: Performed by: INTERNAL MEDICINE

## 2024-10-09 PROCEDURE — 6370000000 HC RX 637 (ALT 250 FOR IP): Performed by: INTERNAL MEDICINE

## 2024-10-09 PROCEDURE — 6360000002 HC RX W HCPCS: Performed by: NURSE PRACTITIONER

## 2024-10-09 PROCEDURE — 6360000002 HC RX W HCPCS: Performed by: INTERNAL MEDICINE

## 2024-10-09 PROCEDURE — 2580000003 HC RX 258: Performed by: INTERNAL MEDICINE

## 2024-10-09 PROCEDURE — 2580000003 HC RX 258: Performed by: HOSPITALIST

## 2024-10-09 PROCEDURE — 6370000000 HC RX 637 (ALT 250 FOR IP): Performed by: HOSPITALIST

## 2024-10-09 PROCEDURE — 80048 BASIC METABOLIC PNL TOTAL CA: CPT

## 2024-10-09 PROCEDURE — 82962 GLUCOSE BLOOD TEST: CPT

## 2024-10-09 PROCEDURE — 97535 SELF CARE MNGMENT TRAINING: CPT

## 2024-10-09 RX ORDER — POTASSIUM CHLORIDE 7.45 MG/ML
10 INJECTION INTRAVENOUS
Status: COMPLETED | OUTPATIENT
Start: 2024-10-09 | End: 2024-10-09

## 2024-10-09 RX ORDER — LOPERAMIDE HCL 1 MG/7.5ML
4 SOLUTION ORAL 4 TIMES DAILY PRN
Status: DISCONTINUED | OUTPATIENT
Start: 2024-10-09 | End: 2024-10-15 | Stop reason: HOSPADM

## 2024-10-09 RX ORDER — DIPHENOXYLATE HCL/ATROPINE 2.5-.025MG
1 TABLET ORAL 3 TIMES DAILY
Status: DISCONTINUED | OUTPATIENT
Start: 2024-10-09 | End: 2024-10-15 | Stop reason: HOSPADM

## 2024-10-09 RX ORDER — DEXTROSE MONOHYDRATE, SODIUM CHLORIDE, AND POTASSIUM CHLORIDE 50; 1.49; 4.5 G/1000ML; G/1000ML; G/1000ML
INJECTION, SOLUTION INTRAVENOUS CONTINUOUS
Status: DISCONTINUED | OUTPATIENT
Start: 2024-10-09 | End: 2024-10-10

## 2024-10-09 RX ADMIN — CLONAZEPAM 0.5 MG: 0.5 TABLET ORAL at 20:04

## 2024-10-09 RX ADMIN — SODIUM CHLORIDE, PRESERVATIVE FREE 10 ML: 5 INJECTION INTRAVENOUS at 07:56

## 2024-10-09 RX ADMIN — FINASTERIDE 5 MG: 5 TABLET, FILM COATED ORAL at 20:09

## 2024-10-09 RX ADMIN — PANCRELIPASE LIPASE, PANCRELIPASE PROTEASE, PANCRELIPASE AMYLASE 20000 UNITS: 20000; 63000; 84000 CAPSULE, DELAYED RELEASE ORAL at 16:09

## 2024-10-09 RX ADMIN — DULOXETINE HYDROCHLORIDE 60 MG: 60 CAPSULE, DELAYED RELEASE ORAL at 16:09

## 2024-10-09 RX ADMIN — POTASSIUM CHLORIDE 10 MEQ: 7.46 INJECTION, SOLUTION INTRAVENOUS at 11:29

## 2024-10-09 RX ADMIN — HYDROCORTISONE 2.5%: 25 CREAM TOPICAL at 07:58

## 2024-10-09 RX ADMIN — PETROLATUM: 420 OINTMENT TOPICAL at 00:16

## 2024-10-09 RX ADMIN — SODIUM CHLORIDE, PRESERVATIVE FREE 10 ML: 5 INJECTION INTRAVENOUS at 20:05

## 2024-10-09 RX ADMIN — LOPERAMIDE HYDROCHLORIDE 4 MG: 1 SOLUTION ORAL at 20:04

## 2024-10-09 RX ADMIN — Medication 3 MG: at 20:04

## 2024-10-09 RX ADMIN — METRONIDAZOLE 500 MG: 500 INJECTION, SOLUTION INTRAVENOUS at 00:05

## 2024-10-09 RX ADMIN — MONTELUKAST 10 MG: 10 TABLET, FILM COATED ORAL at 07:57

## 2024-10-09 RX ADMIN — ARIPIPRAZOLE 2 MG: 2 TABLET ORAL at 07:57

## 2024-10-09 RX ADMIN — DULOXETINE HYDROCHLORIDE 60 MG: 60 CAPSULE, DELAYED RELEASE ORAL at 07:57

## 2024-10-09 RX ADMIN — METRONIDAZOLE 500 MG: 500 INJECTION, SOLUTION INTRAVENOUS at 07:55

## 2024-10-09 RX ADMIN — POTASSIUM CHLORIDE 10 MEQ: 7.46 INJECTION, SOLUTION INTRAVENOUS at 09:42

## 2024-10-09 RX ADMIN — METRONIDAZOLE 500 MG: 500 INJECTION, SOLUTION INTRAVENOUS at 16:14

## 2024-10-09 RX ADMIN — DIPHENOXYLATE HYDROCHLORIDE AND ATROPINE SULFATE 1 TABLET: 2.5; .025 TABLET ORAL at 11:27

## 2024-10-09 RX ADMIN — CETIRIZINE HYDROCHLORIDE 10 MG: 10 TABLET, FILM COATED ORAL at 07:57

## 2024-10-09 RX ADMIN — PANTOPRAZOLE SODIUM 40 MG: 40 INJECTION, POWDER, FOR SOLUTION INTRAVENOUS at 20:04

## 2024-10-09 RX ADMIN — DIPHENOXYLATE HYDROCHLORIDE AND ATROPINE SULFATE 1 TABLET: 2.5; .025 TABLET ORAL at 20:04

## 2024-10-09 RX ADMIN — CLONAZEPAM 0.5 MG: 0.5 TABLET ORAL at 07:57

## 2024-10-09 RX ADMIN — POTASSIUM CHLORIDE 10 MEQ: 7.46 INJECTION, SOLUTION INTRAVENOUS at 08:50

## 2024-10-09 RX ADMIN — POTASSIUM CHLORIDE 10 MEQ: 7.46 INJECTION, SOLUTION INTRAVENOUS at 10:38

## 2024-10-09 RX ADMIN — DIPHENOXYLATE HYDROCHLORIDE AND ATROPINE SULFATE 1 TABLET: 2.5; .025 TABLET ORAL at 16:09

## 2024-10-09 RX ADMIN — LEVOTHYROXINE SODIUM 75 MCG: 75 TABLET ORAL at 05:52

## 2024-10-09 RX ADMIN — HYDROCORTISONE 2.5%: 25 CREAM TOPICAL at 20:05

## 2024-10-09 RX ADMIN — POTASSIUM CHLORIDE, DEXTROSE MONOHYDRATE AND SODIUM CHLORIDE: 150; 5; 450 INJECTION, SOLUTION INTRAVENOUS at 16:12

## 2024-10-09 RX ADMIN — SODIUM CHLORIDE, POTASSIUM CHLORIDE, SODIUM LACTATE AND CALCIUM CHLORIDE: 600; 310; 30; 20 INJECTION, SOLUTION INTRAVENOUS at 11:28

## 2024-10-09 RX ADMIN — RIVAROXABAN 20 MG: 20 TABLET, FILM COATED ORAL at 16:09

## 2024-10-09 RX ADMIN — PANTOPRAZOLE SODIUM 40 MG: 40 INJECTION, POWDER, FOR SOLUTION INTRAVENOUS at 07:55

## 2024-10-09 ASSESSMENT — PAIN SCALES - GENERAL
PAINLEVEL_OUTOF10: 0

## 2024-10-09 NOTE — PLAN OF CARE
Problem: Chronic Conditions and Co-morbidities  Goal: Patient's chronic conditions and co-morbidity symptoms are monitored and maintained or improved  10/9/2024 0938 by Komal Alonzo RN  Outcome: Progressing     Problem: Discharge Planning  Goal: Discharge to home or other facility with appropriate resources  10/9/2024 0938 by Komal Alonzo RN  Outcome: Progressing     Problem: Pain  Goal: Verbalizes/displays adequate comfort level or baseline comfort level  10/9/2024 0938 by Komal Alonzo RN  Outcome: Progressing     Problem: Skin/Tissue Integrity  Goal: Absence of new skin breakdown  Description: 1.  Monitor for areas of redness and/or skin breakdown  2.  Assess vascular access sites hourly  3.  Every 4-6 hours minimum:  Change oxygen saturation probe site  4.  Every 4-6 hours:  If on nasal continuous positive airway pressure, respiratory therapy assess nares and determine need for appliance change or resting period.  10/9/2024 0938 by Komal Alonzo RN  Outcome: Progressing     Problem: Safety - Adult  Goal: Free from fall injury  10/9/2024 0938 by Komal Alonzo RN  Outcome: Progressing     Problem: ABCDS Injury Assessment  Goal: Absence of physical injury  10/9/2024 0938 by Komal Alonzo RN  Outcome: Progressing     Problem: Nutrition Deficit:  Goal: Optimize nutritional status  10/9/2024 0938 by Komal Alonzo RN  Outcome: Progressing

## 2024-10-09 NOTE — PLAN OF CARE
Problem: Chronic Conditions and Co-morbidities  Goal: Patient's chronic conditions and co-morbidity symptoms are monitored and maintained or improved  10/8/2024 2225 by Cheryl Antonio, RN  Outcome: Progressing     Problem: Discharge Planning  Goal: Discharge to home or other facility with appropriate resources  10/8/2024 2225 by Cheryl Antonio, RN  Outcome: Progressing     Problem: Pain  Goal: Verbalizes/displays adequate comfort level or baseline comfort level  10/8/2024 2225 by Cheryl Antonio, RN  Outcome: Progressing     Problem: Skin/Tissue Integrity  Goal: Absence of new skin breakdown  Description: 1.  Monitor for areas of redness and/or skin breakdown  2.  Assess vascular access sites hourly  3.  Every 4-6 hours minimum:  Change oxygen saturation probe site  4.  Every 4-6 hours:  If on nasal continuous positive airway pressure, respiratory therapy assess nares and determine need for appliance change or resting period.  10/8/2024 2225 by Cheryl Antonio, RN  Outcome: Progressing     Problem: Safety - Adult  Goal: Free from fall injury  10/8/2024 2225 by Cheryl Antonio, RN  Outcome: Progressing     Problem: ABCDS Injury Assessment  Goal: Absence of physical injury  10/8/2024 2225 by Cheryl Antonio, RN  Outcome: Progressing     Problem: Nutrition Deficit:  Goal: Optimize nutritional status  Outcome: Progressing

## 2024-10-10 ENCOUNTER — APPOINTMENT (OUTPATIENT)
Dept: ULTRASOUND IMAGING | Age: 74
DRG: 378 | End: 2024-10-10
Payer: MEDICARE

## 2024-10-10 PROBLEM — R60.1 ANASARCA: Status: ACTIVE | Noted: 2024-10-10

## 2024-10-10 LAB
ANION GAP SERPL CALCULATED.3IONS-SCNC: 11 MMOL/L (ref 7–16)
BASOPHILS # BLD: 0.03 K/UL (ref 0–0.2)
BASOPHILS NFR BLD: 0 % (ref 0–2)
BUN SERPL-MCNC: 15 MG/DL (ref 6–23)
CALCIUM SERPL-MCNC: 7.8 MG/DL (ref 8.6–10.2)
CHLORIDE SERPL-SCNC: 113 MMOL/L (ref 98–107)
CO2 SERPL-SCNC: 18 MMOL/L (ref 22–29)
CREAT SERPL-MCNC: 1.7 MG/DL (ref 0.7–1.2)
EOSINOPHIL # BLD: 0.83 K/UL (ref 0.05–0.5)
EOSINOPHILS RELATIVE PERCENT: 6 % (ref 0–6)
ERYTHROCYTE [DISTWIDTH] IN BLOOD BY AUTOMATED COUNT: 18.6 % (ref 11.5–15)
GFR, ESTIMATED: 42 ML/MIN/1.73M2
GLUCOSE BLD-MCNC: 114 MG/DL (ref 74–99)
GLUCOSE BLD-MCNC: 129 MG/DL (ref 74–99)
GLUCOSE BLD-MCNC: 155 MG/DL (ref 74–99)
GLUCOSE SERPL-MCNC: 119 MG/DL (ref 74–99)
HCT VFR BLD AUTO: 27.4 % (ref 37–54)
HGB BLD-MCNC: 8.9 G/DL (ref 12.5–16.5)
IMM GRANULOCYTES # BLD AUTO: 0.1 K/UL (ref 0–0.58)
IMM GRANULOCYTES NFR BLD: 1 % (ref 0–5)
LYMPHOCYTES NFR BLD: 1.76 K/UL (ref 1.5–4)
LYMPHOCYTES RELATIVE PERCENT: 12 % (ref 20–42)
MAGNESIUM SERPL-MCNC: 1.5 MG/DL (ref 1.6–2.6)
MCH RBC QN AUTO: 31.6 PG (ref 26–35)
MCHC RBC AUTO-ENTMCNC: 32.5 G/DL (ref 32–34.5)
MCV RBC AUTO: 97.2 FL (ref 80–99.9)
MONOCYTES NFR BLD: 1.38 K/UL (ref 0.1–0.95)
MONOCYTES NFR BLD: 9 % (ref 2–12)
NEUTROPHILS NFR BLD: 73 % (ref 43–80)
NEUTS SEG NFR BLD: 10.91 K/UL (ref 1.8–7.3)
PHOSPHATE SERPL-MCNC: 2.6 MG/DL (ref 2.5–4.5)
PLATELET # BLD AUTO: 270 K/UL (ref 130–450)
PMV BLD AUTO: 12.6 FL (ref 7–12)
POTASSIUM SERPL-SCNC: 3.2 MMOL/L (ref 3.5–5)
RBC # BLD AUTO: 2.82 M/UL (ref 3.8–5.8)
SODIUM SERPL-SCNC: 142 MMOL/L (ref 132–146)
WBC OTHER # BLD: 15 K/UL (ref 4.5–11.5)

## 2024-10-10 PROCEDURE — 82962 GLUCOSE BLOOD TEST: CPT

## 2024-10-10 PROCEDURE — 2060000000 HC ICU INTERMEDIATE R&B

## 2024-10-10 PROCEDURE — 97110 THERAPEUTIC EXERCISES: CPT

## 2024-10-10 PROCEDURE — 6370000000 HC RX 637 (ALT 250 FOR IP): Performed by: HOSPITALIST

## 2024-10-10 PROCEDURE — 36415 COLL VENOUS BLD VENIPUNCTURE: CPT

## 2024-10-10 PROCEDURE — 80048 BASIC METABOLIC PNL TOTAL CA: CPT

## 2024-10-10 PROCEDURE — 6370000000 HC RX 637 (ALT 250 FOR IP): Performed by: NURSE PRACTITIONER

## 2024-10-10 PROCEDURE — 84100 ASSAY OF PHOSPHORUS: CPT

## 2024-10-10 PROCEDURE — 6360000002 HC RX W HCPCS: Performed by: NURSE PRACTITIONER

## 2024-10-10 PROCEDURE — 6360000002 HC RX W HCPCS: Performed by: INTERNAL MEDICINE

## 2024-10-10 PROCEDURE — 93971 EXTREMITY STUDY: CPT

## 2024-10-10 PROCEDURE — 85025 COMPLETE CBC W/AUTO DIFF WBC: CPT

## 2024-10-10 PROCEDURE — 2580000003 HC RX 258: Performed by: HOSPITALIST

## 2024-10-10 PROCEDURE — 83735 ASSAY OF MAGNESIUM: CPT

## 2024-10-10 PROCEDURE — 2500000003 HC RX 250 WO HCPCS: Performed by: INTERNAL MEDICINE

## 2024-10-10 RX ORDER — POTASSIUM CHLORIDE 7.45 MG/ML
10 INJECTION INTRAVENOUS
Status: COMPLETED | OUTPATIENT
Start: 2024-10-10 | End: 2024-10-10

## 2024-10-10 RX ORDER — MAGNESIUM SULFATE IN WATER 40 MG/ML
4000 INJECTION, SOLUTION INTRAVENOUS ONCE
Status: COMPLETED | OUTPATIENT
Start: 2024-10-10 | End: 2024-10-10

## 2024-10-10 RX ADMIN — DULOXETINE HYDROCHLORIDE 60 MG: 60 CAPSULE, DELAYED RELEASE ORAL at 07:51

## 2024-10-10 RX ADMIN — PANCRELIPASE LIPASE, PANCRELIPASE PROTEASE, PANCRELIPASE AMYLASE 20000 UNITS: 20000; 63000; 84000 CAPSULE, DELAYED RELEASE ORAL at 07:52

## 2024-10-10 RX ADMIN — METRONIDAZOLE 500 MG: 500 INJECTION, SOLUTION INTRAVENOUS at 14:53

## 2024-10-10 RX ADMIN — LEVOTHYROXINE SODIUM 75 MCG: 75 TABLET ORAL at 06:52

## 2024-10-10 RX ADMIN — MIDODRINE HYDROCHLORIDE 5 MG: 5 TABLET ORAL at 15:57

## 2024-10-10 RX ADMIN — DULOXETINE HYDROCHLORIDE 60 MG: 60 CAPSULE, DELAYED RELEASE ORAL at 15:57

## 2024-10-10 RX ADMIN — POTASSIUM CHLORIDE, DEXTROSE MONOHYDRATE AND SODIUM CHLORIDE: 150; 5; 450 INJECTION, SOLUTION INTRAVENOUS at 01:00

## 2024-10-10 RX ADMIN — MIDODRINE HYDROCHLORIDE 5 MG: 5 TABLET ORAL at 07:51

## 2024-10-10 RX ADMIN — METRONIDAZOLE 500 MG: 500 INJECTION, SOLUTION INTRAVENOUS at 07:56

## 2024-10-10 RX ADMIN — RIVAROXABAN 20 MG: 20 TABLET, FILM COATED ORAL at 15:57

## 2024-10-10 RX ADMIN — POTASSIUM CHLORIDE 10 MEQ: 7.46 INJECTION, SOLUTION INTRAVENOUS at 12:54

## 2024-10-10 RX ADMIN — POTASSIUM CHLORIDE 10 MEQ: 7.46 INJECTION, SOLUTION INTRAVENOUS at 10:09

## 2024-10-10 RX ADMIN — SODIUM CHLORIDE, PRESERVATIVE FREE 10 ML: 5 INJECTION INTRAVENOUS at 20:43

## 2024-10-10 RX ADMIN — FINASTERIDE 5 MG: 5 TABLET, FILM COATED ORAL at 20:44

## 2024-10-10 RX ADMIN — MONTELUKAST 10 MG: 10 TABLET, FILM COATED ORAL at 07:52

## 2024-10-10 RX ADMIN — HYDROCORTISONE 2.5%: 25 CREAM TOPICAL at 07:52

## 2024-10-10 RX ADMIN — POTASSIUM CHLORIDE 10 MEQ: 7.46 INJECTION, SOLUTION INTRAVENOUS at 11:29

## 2024-10-10 RX ADMIN — CLONAZEPAM 0.5 MG: 0.5 TABLET ORAL at 20:49

## 2024-10-10 RX ADMIN — LEVOFLOXACIN 750 MG: 750 INJECTION, SOLUTION INTRAVENOUS at 14:53

## 2024-10-10 RX ADMIN — SODIUM CHLORIDE, PRESERVATIVE FREE 10 ML: 5 INJECTION INTRAVENOUS at 07:52

## 2024-10-10 RX ADMIN — ARIPIPRAZOLE 2 MG: 2 TABLET ORAL at 07:51

## 2024-10-10 RX ADMIN — HYDROCORTISONE 2.5%: 25 CREAM TOPICAL at 20:44

## 2024-10-10 RX ADMIN — DIPHENOXYLATE HYDROCHLORIDE AND ATROPINE SULFATE 1 TABLET: 2.5; .025 TABLET ORAL at 14:54

## 2024-10-10 RX ADMIN — METRONIDAZOLE 500 MG: 500 INJECTION, SOLUTION INTRAVENOUS at 00:56

## 2024-10-10 RX ADMIN — MAGNESIUM SULFATE HEPTAHYDRATE 4000 MG: 40 INJECTION, SOLUTION INTRAVENOUS at 09:08

## 2024-10-10 RX ADMIN — DIPHENOXYLATE HYDROCHLORIDE AND ATROPINE SULFATE 1 TABLET: 2.5; .025 TABLET ORAL at 07:51

## 2024-10-10 RX ADMIN — PANTOPRAZOLE SODIUM 40 MG: 40 INJECTION, POWDER, FOR SOLUTION INTRAVENOUS at 07:52

## 2024-10-10 RX ADMIN — CETIRIZINE HYDROCHLORIDE 10 MG: 10 TABLET, FILM COATED ORAL at 07:51

## 2024-10-10 RX ADMIN — POTASSIUM CHLORIDE 10 MEQ: 7.46 INJECTION, SOLUTION INTRAVENOUS at 09:04

## 2024-10-10 RX ADMIN — MIDODRINE HYDROCHLORIDE 5 MG: 5 TABLET ORAL at 11:27

## 2024-10-10 RX ADMIN — PANTOPRAZOLE SODIUM 40 MG: 40 INJECTION, POWDER, FOR SOLUTION INTRAVENOUS at 20:44

## 2024-10-10 RX ADMIN — PANCRELIPASE LIPASE, PANCRELIPASE PROTEASE, PANCRELIPASE AMYLASE 20000 UNITS: 20000; 63000; 84000 CAPSULE, DELAYED RELEASE ORAL at 15:57

## 2024-10-10 RX ADMIN — DIPHENOXYLATE HYDROCHLORIDE AND ATROPINE SULFATE 1 TABLET: 2.5; .025 TABLET ORAL at 20:44

## 2024-10-10 RX ADMIN — PANCRELIPASE LIPASE, PANCRELIPASE PROTEASE, PANCRELIPASE AMYLASE 20000 UNITS: 20000; 63000; 84000 CAPSULE, DELAYED RELEASE ORAL at 12:55

## 2024-10-10 ASSESSMENT — PAIN SCALES - GENERAL
PAINLEVEL_OUTOF10: 0

## 2024-10-10 NOTE — CONSULTS
[facility]  MD Aram Batista MD Ali Hassan, MD Lisa Kniska, JOSE LUIS Johnson CNP  Consultation  Patient's Name: Anuel Kwok  11:49 AM  10/4/2024    Nephrologist: Blas Tiwari MD    Reason for Consult:  SHITAL  Requesting Physician:  Valentín Christianson MD    Chief Complaint:  GI bleed     History Obtained From: Patient, chart    History of Present Ilness:         Mr. Kwok is a pleasant 74-year-old gentleman who presented to the hospital with dizziness and bright red rectal bleeding that started yesterday.  He is on Xarelto daily for a history of blood clots.  On arrival to the hospital his labs were significant for a CO2 of 14, BUN 28, creatinine 2.4, WBC 13.4, and hemoglobin of 10.5.  He has received 3 units of packed red blood cells since admission.  After receiving the transfusion his hemoglobin went up to 12.2, though has now gone down to 8.3 as of this morning.  He continues to have bright red blood clots from his rectum.  He had a blood loss scan yesterday that showed no evidence of active GI bleeding.  Today, he underwent a CT scan of the abdomen that showed distal descending and proximal sigmoid colonic diverticulitis and hepatic steatosis.  Another STAT bleeding scan was ordered today as his hemoglobin continues to drop.  His blood pressure was very low on arrival to the ED at 64/45.  His past medical history is significant for blood clots, hyperlipidemia, hypothyroidism, type 2 diabetes mellitus, and chronic kidney disease.          We were consulted for acute kidney injury.  His creatinine level on arrival to the hospital was 2.4 mg/dL and has gone up to 3.2 mg/dL as of today.  He also has severe metabolic acidosis with a CO2 level of 11.  He is mildly hyperkalemic today with a potassium of 5.2.  He reports that he did urinate this morning for the first time, though it was unable to be collected as it happened while he was 
10/03/2024 07:30 AM    CREATININE 2.4 10/03/2024 07:30 AM    GFRAA 46 08/30/2016 11:41 AM    LABGLOM 27 10/03/2024 07:30 AM    GLUCOSE 220 10/03/2024 07:30 AM    CALCIUM 9.1 10/03/2024 07:30 AM    BILITOT 0.2 10/03/2024 07:30 AM    ALKPHOS 50 10/03/2024 07:30 AM    AST 14 10/03/2024 07:30 AM    ALT 8 10/03/2024 07:30 AM     Hepatic Function Panel:    Lab Results   Component Value Date/Time    ALKPHOS 50 10/03/2024 07:30 AM    ALT 8 10/03/2024 07:30 AM    AST 14 10/03/2024 07:30 AM    BILITOT 0.2 10/03/2024 07:30 AM    BILIDIR <0.2 02/19/2014 07:35 AM     PT/INR:    Lab Results   Component Value Date/Time    PROTIME 19.9 10/03/2024 07:30 AM    INR 1.9 10/03/2024 07:30 AM     PTT:    Lab Results   Component Value Date/Time    APTT 50.6 07/17/2014 04:15 AM   [APTT}  Last 3 Troponin:    Lab Results   Component Value Date/Time    TROPONINI <0.01 07/15/2014 03:15 PM    TROPONINI <0.01 04/03/2014 11:20 AM    TROPONINI <0.01 04/03/2014 06:00 AM     TSH:    Lab Results   Component Value Date/Time    TSH 0.620 07/16/2014 05:05 AM       FERRITIN:    Lab Results   Component Value Date/Time    FERRITIN 211 02/19/2014 07:35 AM       No results found.    IMPRESSION:  Hematochezia  Anemia, normocytic  LLQ pain  Diverticulosis  Hemorrhoids  SHITAL    RECOMMENDATIONS:    Bleeding scan today  Serial H&H, transfuse less than 7 per primary care  Protonix 40 MG PO BID  OK for clears after bleeding scan completed  Supportive care  Medical management per primary care  Endoscopy/colonoscopy pending clinical course  Anusol cream twice daily  Iron studies today  Trend labs  Will follow    Thank you very much for your consultation. We will follow closely with you.    Discussed with Dr. Barkley  Treatment plan developed by Dr. Rubia SLATER, NP-C 10/3/2024 10:15 AM for Dr. Barkley      GI Attending Addendum:  The patient was seen and examined independently from the midlevel team. The case was discuss with the team and the above 
  Weight change: -5.534 kg (-12 lb 3.2 oz)  Wt Readings from Last 3 Encounters:   10/10/24 85.9 kg (189 lb 6.4 oz)   11/14/17 75.3 kg (166 lb)   11/28/16 73.9 kg (163 lb)         General: Awake, alert, oriented x3, appears fatigued but in no acute distress  HEENT: Unremarkable  Neck: No JVD or bruits.  Cardiac: Regular rate and rhythm, normal S1 and S2, no extra heart sounds, murmurs, heaves, thrills  Resp: Lungs clear without wheezing or crackles. No accessory muscle use or retraction  Abdomen: soft, nontender, nondistended, no gross organomegaly or mass  Skin: Warm and dry, no cyanosis.  2+ bilateral lower extremity edema.  Musculoskeletal: No identified joint deformity  Neuro: Grossly unremarkable  Psych: Cooperative, and normal affect    Intake/Output:    Intake/Output Summary (Last 24 hours) at 10/10/2024 1308  Last data filed at 10/10/2024 1025  Gross per 24 hour   Intake 3182.3 ml   Output 300 ml   Net 2882.3 ml     I/O this shift:  In: 180 [P.O.:180]  Out: -     Laboratory Tests:  Lab Results   Component Value Date    CREATININE 1.7 (H) 10/10/2024    BUN 15 10/10/2024     10/10/2024    K 3.2 (L) 10/10/2024     (H) 10/10/2024    CO2 18 (L) 10/10/2024     No results for input(s): \"CKTOTAL\", \"CKMB\" in the last 72 hours.    Invalid input(s): \"TROPONONI\"  Lab Results   Component Value Date    BNP 7 10/22/2013     Lab Results   Component Value Date/Time    WBC 15.0 10/10/2024 09:28 AM    RBC 2.82 10/10/2024 09:28 AM    HGB 8.9 10/10/2024 09:28 AM    HCT 27.4 10/10/2024 09:28 AM    MCV 97.2 10/10/2024 09:28 AM    MCH 31.6 10/10/2024 09:28 AM    MCHC 32.5 10/10/2024 09:28 AM    RDW 18.6 10/10/2024 09:28 AM     10/10/2024 09:28 AM    MPV 12.6 10/10/2024 09:28 AM     No results for input(s): \"ALKPHOS\", \"ALT\", \"AST\", \"BILITOT\", \"BILIDIR\", \"LABALBU\" in the last 72 hours.    Invalid input(s): \"PROT\"  Lab Results   Component Value Date/Time    MG 1.5 10/10/2024 03:34 AM     Lab Results   Component Value

## 2024-10-10 NOTE — CARE COORDINATION
Social Work/Discharge Planning:  Met with patient and his wife Alvina and discussed option for home health care at discharge.  They are now agreeable to home health care.  Provided them with a home health care choice list to review.  Alvina states her first home health care choice is [facility] [place] [facility], 2nd-[facility] and 3rd-[facility].  Referral made to liajeannette Paulson with [facility] and she will review patient information.  Patient will need a home health care order.  Will continue to follow.  Electronically signed by CHATO Yuan on 10/10/2024 at 2:07 PM

## 2024-10-10 NOTE — PLAN OF CARE

## 2024-10-11 ENCOUNTER — APPOINTMENT (OUTPATIENT)
Dept: NUCLEAR MEDICINE | Age: 74
DRG: 378 | End: 2024-10-11
Payer: MEDICARE

## 2024-10-11 ENCOUNTER — APPOINTMENT (OUTPATIENT)
Dept: GENERAL RADIOLOGY | Age: 74
DRG: 378 | End: 2024-10-11
Payer: MEDICARE

## 2024-10-11 ENCOUNTER — APPOINTMENT (OUTPATIENT)
Age: 74
DRG: 378 | End: 2024-10-11
Attending: INTERNAL MEDICINE
Payer: MEDICARE

## 2024-10-11 LAB
ANION GAP SERPL CALCULATED.3IONS-SCNC: 9 MMOL/L (ref 7–16)
BASOPHILS # BLD: 0.04 K/UL (ref 0–0.2)
BASOPHILS NFR BLD: 0 % (ref 0–2)
BUN SERPL-MCNC: 14 MG/DL (ref 6–23)
CALCIUM SERPL-MCNC: 7.8 MG/DL (ref 8.6–10.2)
CHLORIDE SERPL-SCNC: 113 MMOL/L (ref 98–107)
CO2 SERPL-SCNC: 20 MMOL/L (ref 22–29)
CREAT SERPL-MCNC: 1.7 MG/DL (ref 0.7–1.2)
ECHO AO ASC DIAM: 3.4 CM
ECHO AO ASCENDING AORTA INDEX: 1.67 CM/M2
ECHO AV AREA PEAK VELOCITY: 3.2 CM2
ECHO AV AREA VTI: 4 CM2
ECHO AV AREA/BSA PEAK VELOCITY: 1.6 CM2/M2
ECHO AV AREA/BSA VTI: 2 CM2/M2
ECHO AV CUSP MM: 2.4 CM
ECHO AV MEAN GRADIENT: 4 MMHG
ECHO AV MEAN VELOCITY: 0.9 M/S
ECHO AV PEAK GRADIENT: 8 MMHG
ECHO AV PEAK VELOCITY: 1.5 M/S
ECHO AV VELOCITY RATIO: 0.87
ECHO AV VTI: 27 CM
ECHO BSA: 1.88 M2
ECHO LA DIAMETER INDEX: 2.01 CM/M2
ECHO LA DIAMETER: 4.1 CM
ECHO LA VOL A-L A2C: 79 ML (ref 18–58)
ECHO LA VOL A-L A4C: 93 ML (ref 18–58)
ECHO LA VOL MOD A2C: 75 ML (ref 18–58)
ECHO LA VOL MOD A4C: 89 ML (ref 18–58)
ECHO LA VOLUME AREA LENGTH: 89 ML
ECHO LA VOLUME INDEX A-L A2C: 39 ML/M2 (ref 16–34)
ECHO LA VOLUME INDEX A-L A4C: 46 ML/M2 (ref 16–34)
ECHO LA VOLUME INDEX AREA LENGTH: 44 ML/M2 (ref 16–34)
ECHO LA VOLUME INDEX MOD A2C: 37 ML/M2 (ref 16–34)
ECHO LA VOLUME INDEX MOD A4C: 44 ML/M2 (ref 16–34)
ECHO LV EF PHYSICIAN: 65 %
ECHO LV FRACTIONAL SHORTENING: 30 % (ref 28–44)
ECHO LV INTERNAL DIMENSION DIASTOLE INDEX: 2.45 CM/M2
ECHO LV INTERNAL DIMENSION DIASTOLIC: 5 CM (ref 4.2–5.9)
ECHO LV INTERNAL DIMENSION SYSTOLIC INDEX: 1.72 CM/M2
ECHO LV INTERNAL DIMENSION SYSTOLIC: 3.5 CM
ECHO LV IVSD: 1.2 CM (ref 0.6–1)
ECHO LV IVSS: 1.4 CM
ECHO LV MASS 2D: 291.4 G (ref 88–224)
ECHO LV MASS INDEX 2D: 142.9 G/M2 (ref 49–115)
ECHO LV POSTERIOR WALL DIASTOLIC: 1.6 CM (ref 0.6–1)
ECHO LV POSTERIOR WALL SYSTOLIC: 2.2 CM
ECHO LV RELATIVE WALL THICKNESS RATIO: 0.64
ECHO LVOT AREA: 3.8 CM2
ECHO LVOT AV VTI INDEX: 1.09
ECHO LVOT DIAM: 2.2 CM
ECHO LVOT MEAN GRADIENT: 4 MMHG
ECHO LVOT PEAK GRADIENT: 7 MMHG
ECHO LVOT PEAK VELOCITY: 1.3 M/S
ECHO LVOT STROKE VOLUME INDEX: 54.9 ML/M2
ECHO LVOT SV: 112.1 ML
ECHO LVOT VTI: 29.5 CM
ECHO MV "A" WAVE DURATION: 106.1 MSEC
ECHO MV A VELOCITY: 1 M/S
ECHO MV AREA PHT: 4.5 CM2
ECHO MV AREA VTI: 3.4 CM2
ECHO MV E DECELERATION TIME (DT): 163.8 MS
ECHO MV E VELOCITY: 1.18 M/S
ECHO MV E/A RATIO: 1.18
ECHO MV LVOT VTI INDEX: 1.12
ECHO MV MAX VELOCITY: 1.3 M/S
ECHO MV MEAN GRADIENT: 4 MMHG
ECHO MV MEAN VELOCITY: 0.9 M/S
ECHO MV PEAK GRADIENT: 7 MMHG
ECHO MV PRESSURE HALF TIME (PHT): 49.4 MS
ECHO MV REGURGITANT VELOCITY PISA: 6 M/S
ECHO MV REGURGITANT VTIA: 191.6 CM
ECHO MV VTI: 33 CM
ECHO PULMONARY ARTERY END DIASTOLIC PRESSURE: 7 MMHG
ECHO PV MAX VELOCITY: 0.9 M/S
ECHO PV MEAN GRADIENT: 2 MMHG
ECHO PV MEAN VELOCITY: 0.7 M/S
ECHO PV PEAK GRADIENT: 3 MMHG
ECHO PV REGURGITANT MAX VELOCITY: 1.3 M/S
ECHO PV VTI: 18.9 CM
ECHO PVEIN A DURATION: 107.3 MS
ECHO PVEIN A VELOCITY: 0.3 M/S
ECHO PVEIN PEAK D VELOCITY: 0.5 M/S
ECHO PVEIN PEAK S VELOCITY: 0.6 M/S
ECHO PVEIN S/D RATIO: 1.2
ECHO TV REGURGITANT MAX VELOCITY: 1.69 M/S
ECHO TV REGURGITANT PEAK GRADIENT: 11 MMHG
EOSINOPHIL # BLD: 0.78 K/UL (ref 0.05–0.5)
EOSINOPHILS RELATIVE PERCENT: 5 % (ref 0–6)
ERYTHROCYTE [DISTWIDTH] IN BLOOD BY AUTOMATED COUNT: 18.7 % (ref 11.5–15)
GFR, ESTIMATED: 43 ML/MIN/1.73M2
GLUCOSE BLD-MCNC: 120 MG/DL (ref 74–99)
GLUCOSE BLD-MCNC: 130 MG/DL (ref 74–99)
GLUCOSE BLD-MCNC: 86 MG/DL (ref 74–99)
GLUCOSE BLD-MCNC: 90 MG/DL (ref 74–99)
GLUCOSE SERPL-MCNC: 103 MG/DL (ref 74–99)
HCT VFR BLD AUTO: 27.3 % (ref 37–54)
HGB BLD-MCNC: 8.8 G/DL (ref 12.5–16.5)
IMM GRANULOCYTES # BLD AUTO: 0.11 K/UL (ref 0–0.58)
IMM GRANULOCYTES NFR BLD: 1 % (ref 0–5)
LYMPHOCYTES NFR BLD: 1.81 K/UL (ref 1.5–4)
LYMPHOCYTES RELATIVE PERCENT: 12 % (ref 20–42)
MAGNESIUM SERPL-MCNC: 1.9 MG/DL (ref 1.6–2.6)
MCH RBC QN AUTO: 31 PG (ref 26–35)
MCHC RBC AUTO-ENTMCNC: 32.2 G/DL (ref 32–34.5)
MCV RBC AUTO: 96.1 FL (ref 80–99.9)
MONOCYTES NFR BLD: 1.49 K/UL (ref 0.1–0.95)
MONOCYTES NFR BLD: 10 % (ref 2–12)
NEUTROPHILS NFR BLD: 72 % (ref 43–80)
NEUTS SEG NFR BLD: 10.63 K/UL (ref 1.8–7.3)
PLATELET # BLD AUTO: 271 K/UL (ref 130–450)
PMV BLD AUTO: 11.7 FL (ref 7–12)
POTASSIUM SERPL-SCNC: 3.1 MMOL/L (ref 3.5–5)
RBC # BLD AUTO: 2.84 M/UL (ref 3.8–5.8)
SODIUM SERPL-SCNC: 142 MMOL/L (ref 132–146)
WBC OTHER # BLD: 14.9 K/UL (ref 4.5–11.5)

## 2024-10-11 PROCEDURE — 6370000000 HC RX 637 (ALT 250 FOR IP): Performed by: INTERNAL MEDICINE

## 2024-10-11 PROCEDURE — 2580000003 HC RX 258: Performed by: HOSPITALIST

## 2024-10-11 PROCEDURE — 6360000002 HC RX W HCPCS: Performed by: INTERNAL MEDICINE

## 2024-10-11 PROCEDURE — 71045 X-RAY EXAM CHEST 1 VIEW: CPT

## 2024-10-11 PROCEDURE — 85025 COMPLETE CBC W/AUTO DIFF WBC: CPT

## 2024-10-11 PROCEDURE — 6370000000 HC RX 637 (ALT 250 FOR IP): Performed by: HOSPITALIST

## 2024-10-11 PROCEDURE — 6370000000 HC RX 637 (ALT 250 FOR IP): Performed by: NURSE PRACTITIONER

## 2024-10-11 PROCEDURE — 80048 BASIC METABOLIC PNL TOTAL CA: CPT

## 2024-10-11 PROCEDURE — 3430000000 HC RX DIAGNOSTIC RADIOPHARMACEUTICAL: Performed by: RADIOLOGY

## 2024-10-11 PROCEDURE — 93306 TTE W/DOPPLER COMPLETE: CPT

## 2024-10-11 PROCEDURE — A9540 TC99M MAA: HCPCS | Performed by: RADIOLOGY

## 2024-10-11 PROCEDURE — 78580 LUNG PERFUSION IMAGING: CPT

## 2024-10-11 PROCEDURE — 2060000000 HC ICU INTERMEDIATE R&B

## 2024-10-11 PROCEDURE — 83735 ASSAY OF MAGNESIUM: CPT

## 2024-10-11 PROCEDURE — 82962 GLUCOSE BLOOD TEST: CPT

## 2024-10-11 RX ORDER — MIDODRINE HYDROCHLORIDE 5 MG/1
10 TABLET ORAL
Status: DISCONTINUED | OUTPATIENT
Start: 2024-10-11 | End: 2024-10-15 | Stop reason: HOSPADM

## 2024-10-11 RX ORDER — BUMETANIDE 0.25 MG/ML
1 INJECTION INTRAMUSCULAR; INTRAVENOUS ONCE
Status: COMPLETED | OUTPATIENT
Start: 2024-10-11 | End: 2024-10-11

## 2024-10-11 RX ORDER — SODIUM BICARBONATE 650 MG/1
650 TABLET ORAL 2 TIMES DAILY
Status: DISCONTINUED | OUTPATIENT
Start: 2024-10-11 | End: 2024-10-15 | Stop reason: HOSPADM

## 2024-10-11 RX ORDER — LEVOFLOXACIN 500 MG/1
750 TABLET, FILM COATED ORAL
Status: DISCONTINUED | OUTPATIENT
Start: 2024-10-12 | End: 2024-10-15 | Stop reason: HOSPADM

## 2024-10-11 RX ORDER — METRONIDAZOLE 500 MG/1
500 TABLET ORAL EVERY 8 HOURS
Status: DISCONTINUED | OUTPATIENT
Start: 2024-10-11 | End: 2024-10-15 | Stop reason: HOSPADM

## 2024-10-11 RX ORDER — POTASSIUM CHLORIDE 1500 MG/1
40 TABLET, EXTENDED RELEASE ORAL ONCE
Status: COMPLETED | OUTPATIENT
Start: 2024-10-11 | End: 2024-10-11

## 2024-10-11 RX ORDER — POTASSIUM CHLORIDE 7.45 MG/ML
10 INJECTION INTRAVENOUS
Status: COMPLETED | OUTPATIENT
Start: 2024-10-11 | End: 2024-10-11

## 2024-10-11 RX ADMIN — PANTOPRAZOLE SODIUM 40 MG: 40 INJECTION, POWDER, FOR SOLUTION INTRAVENOUS at 20:43

## 2024-10-11 RX ADMIN — PANCRELIPASE LIPASE, PANCRELIPASE PROTEASE, PANCRELIPASE AMYLASE 20000 UNITS: 20000; 63000; 84000 CAPSULE, DELAYED RELEASE ORAL at 17:23

## 2024-10-11 RX ADMIN — CLONAZEPAM 0.5 MG: 0.5 TABLET ORAL at 20:47

## 2024-10-11 RX ADMIN — PANCRELIPASE LIPASE, PANCRELIPASE PROTEASE, PANCRELIPASE AMYLASE 20000 UNITS: 20000; 63000; 84000 CAPSULE, DELAYED RELEASE ORAL at 07:56

## 2024-10-11 RX ADMIN — SODIUM CHLORIDE, PRESERVATIVE FREE 10 ML: 5 INJECTION INTRAVENOUS at 20:43

## 2024-10-11 RX ADMIN — PANCRELIPASE LIPASE, PANCRELIPASE PROTEASE, PANCRELIPASE AMYLASE 20000 UNITS: 20000; 63000; 84000 CAPSULE, DELAYED RELEASE ORAL at 11:32

## 2024-10-11 RX ADMIN — METRONIDAZOLE 500 MG: 500 TABLET ORAL at 08:14

## 2024-10-11 RX ADMIN — POTASSIUM CHLORIDE 10 MEQ: 7.46 INJECTION, SOLUTION INTRAVENOUS at 14:14

## 2024-10-11 RX ADMIN — METRONIDAZOLE 500 MG: 500 TABLET ORAL at 23:35

## 2024-10-11 RX ADMIN — MIDODRINE HYDROCHLORIDE 10 MG: 5 TABLET ORAL at 11:14

## 2024-10-11 RX ADMIN — FINASTERIDE 5 MG: 5 TABLET, FILM COATED ORAL at 20:44

## 2024-10-11 RX ADMIN — METRONIDAZOLE 500 MG: 500 INJECTION, SOLUTION INTRAVENOUS at 00:17

## 2024-10-11 RX ADMIN — PANTOPRAZOLE SODIUM 40 MG: 40 INJECTION, POWDER, FOR SOLUTION INTRAVENOUS at 07:58

## 2024-10-11 RX ADMIN — MIDODRINE HYDROCHLORIDE 10 MG: 5 TABLET ORAL at 07:56

## 2024-10-11 RX ADMIN — SODIUM CHLORIDE, PRESERVATIVE FREE 10 ML: 5 INJECTION INTRAVENOUS at 07:58

## 2024-10-11 RX ADMIN — POTASSIUM CHLORIDE 10 MEQ: 7.46 INJECTION, SOLUTION INTRAVENOUS at 10:09

## 2024-10-11 RX ADMIN — DULOXETINE HYDROCHLORIDE 60 MG: 60 CAPSULE, DELAYED RELEASE ORAL at 15:13

## 2024-10-11 RX ADMIN — CLONAZEPAM 0.5 MG: 0.5 TABLET ORAL at 08:14

## 2024-10-11 RX ADMIN — SODIUM BICARBONATE 650 MG: 650 TABLET ORAL at 10:05

## 2024-10-11 RX ADMIN — LEVOTHYROXINE SODIUM 75 MCG: 75 TABLET ORAL at 05:56

## 2024-10-11 RX ADMIN — ONDANSETRON 4 MG: 4 TABLET, ORALLY DISINTEGRATING ORAL at 08:14

## 2024-10-11 RX ADMIN — POTASSIUM CHLORIDE 10 MEQ: 7.46 INJECTION, SOLUTION INTRAVENOUS at 11:31

## 2024-10-11 RX ADMIN — PETROLATUM: 420 OINTMENT TOPICAL at 03:59

## 2024-10-11 RX ADMIN — DIPHENOXYLATE HYDROCHLORIDE AND ATROPINE SULFATE 1 TABLET: 2.5; .025 TABLET ORAL at 14:13

## 2024-10-11 RX ADMIN — BUMETANIDE 1 MG: 0.25 INJECTION INTRAMUSCULAR; INTRAVENOUS at 08:59

## 2024-10-11 RX ADMIN — POTASSIUM CHLORIDE 40 MEQ: 1500 TABLET, EXTENDED RELEASE ORAL at 08:59

## 2024-10-11 RX ADMIN — DIPHENOXYLATE HYDROCHLORIDE AND ATROPINE SULFATE 1 TABLET: 2.5; .025 TABLET ORAL at 20:44

## 2024-10-11 RX ADMIN — RIVAROXABAN 20 MG: 20 TABLET, FILM COATED ORAL at 17:23

## 2024-10-11 RX ADMIN — Medication 6 MILLICURIE: at 13:02

## 2024-10-11 RX ADMIN — DIPHENOXYLATE HYDROCHLORIDE AND ATROPINE SULFATE 1 TABLET: 2.5; .025 TABLET ORAL at 07:55

## 2024-10-11 RX ADMIN — CETIRIZINE HYDROCHLORIDE 10 MG: 10 TABLET, FILM COATED ORAL at 07:56

## 2024-10-11 RX ADMIN — MIDODRINE HYDROCHLORIDE 10 MG: 5 TABLET ORAL at 15:13

## 2024-10-11 RX ADMIN — DULOXETINE HYDROCHLORIDE 60 MG: 60 CAPSULE, DELAYED RELEASE ORAL at 07:56

## 2024-10-11 RX ADMIN — HYDROCORTISONE 2.5%: 25 CREAM TOPICAL at 08:03

## 2024-10-11 RX ADMIN — HYDROCORTISONE 2.5%: 25 CREAM TOPICAL at 20:44

## 2024-10-11 RX ADMIN — MONTELUKAST 10 MG: 10 TABLET, FILM COATED ORAL at 07:56

## 2024-10-11 RX ADMIN — SODIUM BICARBONATE 650 MG: 650 TABLET ORAL at 20:44

## 2024-10-11 RX ADMIN — METRONIDAZOLE 500 MG: 500 TABLET ORAL at 15:13

## 2024-10-11 RX ADMIN — ARIPIPRAZOLE 2 MG: 2 TABLET ORAL at 07:57

## 2024-10-11 RX ADMIN — POTASSIUM CHLORIDE 10 MEQ: 7.46 INJECTION, SOLUTION INTRAVENOUS at 09:01

## 2024-10-11 ASSESSMENT — PAIN SCALES - GENERAL
PAINLEVEL_OUTOF10: 0

## 2024-10-11 NOTE — CARE COORDINATION
Social Work/Discharge Planning:  Lorene with [facility] states they can accept patient and start of care is Naren 10/13, if patient discharges.  Patient will need a home health care order and need to notify [facility] (ph: [phone]) when patient discharges.  Notified patient wife Alvina of [facility] acceptance.  Patient to have an echo.  Plan remains home when medically stable.  Will continue to follow.  Electronically signed by CHATO Yuan on 10/11/2024 at 10:09 AM

## 2024-10-11 NOTE — PLAN OF CARE
Problem: Chronic Conditions and Co-morbidities  Goal: Patient's chronic conditions and co-morbidity symptoms are monitored and maintained or improved  10/11/2024 0945 by Smooth Hunt RN  Outcome: Progressing     Problem: Discharge Planning  Goal: Discharge to home or other facility with appropriate resources  10/11/2024 0945 by Somoth Hunt RN  Outcome: Progressing     Problem: Pain  Goal: Verbalizes/displays adequate comfort level or baseline comfort level  10/11/2024 0945 by Smooth Hunt RN  Outcome: Progressing     Problem: Skin/Tissue Integrity  Goal: Absence of new skin breakdown  Description: 1.  Monitor for areas of redness and/or skin breakdown  2.  Assess vascular access sites hourly  3.  Every 4-6 hours minimum:  Change oxygen saturation probe site  4.  Every 4-6 hours:  If on nasal continuous positive airway pressure, respiratory therapy assess nares and determine need for appliance change or resting period.  10/11/2024 0945 by Smooth Hunt RN  Outcome: Progressing     Problem: Safety - Adult  Goal: Free from fall injury  10/11/2024 0945 by Smooth Hunt RN  Outcome: Progressing     Problem: ABCDS Injury Assessment  Goal: Absence of physical injury  10/11/2024 0945 by Smooth Hunt RN  Outcome: Progressing     Problem: Nutrition Deficit:  Goal: Optimize nutritional status  10/11/2024 0945 by Smooth Hunt RN  Outcome: Progressing

## 2024-10-12 LAB
ANION GAP SERPL CALCULATED.3IONS-SCNC: 9 MMOL/L (ref 7–16)
BASOPHILS # BLD: 0.05 K/UL (ref 0–0.2)
BASOPHILS NFR BLD: 0 % (ref 0–2)
BUN SERPL-MCNC: 13 MG/DL (ref 6–23)
CALCIUM SERPL-MCNC: 7.9 MG/DL (ref 8.6–10.2)
CHLORIDE SERPL-SCNC: 113 MMOL/L (ref 98–107)
CO2 SERPL-SCNC: 19 MMOL/L (ref 22–29)
CREAT SERPL-MCNC: 1.8 MG/DL (ref 0.7–1.2)
EOSINOPHIL # BLD: 0.78 K/UL (ref 0.05–0.5)
EOSINOPHILS RELATIVE PERCENT: 6 % (ref 0–6)
ERYTHROCYTE [DISTWIDTH] IN BLOOD BY AUTOMATED COUNT: 19.3 % (ref 11.5–15)
GFR, ESTIMATED: 39 ML/MIN/1.73M2
GLUCOSE BLD-MCNC: 133 MG/DL (ref 74–99)
GLUCOSE BLD-MCNC: 87 MG/DL (ref 74–99)
GLUCOSE BLD-MCNC: 88 MG/DL (ref 74–99)
GLUCOSE BLD-MCNC: 93 MG/DL (ref 74–99)
GLUCOSE SERPL-MCNC: 97 MG/DL (ref 74–99)
HCT VFR BLD AUTO: 29.7 % (ref 37–54)
HGB BLD-MCNC: 9 G/DL (ref 12.5–16.5)
IMM GRANULOCYTES # BLD AUTO: 0.11 K/UL (ref 0–0.58)
IMM GRANULOCYTES NFR BLD: 1 % (ref 0–5)
LYMPHOCYTES NFR BLD: 2.1 K/UL (ref 1.5–4)
LYMPHOCYTES RELATIVE PERCENT: 16 % (ref 20–42)
MAGNESIUM SERPL-MCNC: 1.6 MG/DL (ref 1.6–2.6)
MCH RBC QN AUTO: 30.6 PG (ref 26–35)
MCHC RBC AUTO-ENTMCNC: 30.3 G/DL (ref 32–34.5)
MCV RBC AUTO: 101 FL (ref 80–99.9)
MONOCYTES NFR BLD: 1.5 K/UL (ref 0.1–0.95)
MONOCYTES NFR BLD: 12 % (ref 2–12)
NEUTROPHILS NFR BLD: 65 % (ref 43–80)
NEUTS SEG NFR BLD: 8.37 K/UL (ref 1.8–7.3)
PHOSPHATE SERPL-MCNC: 3.4 MG/DL (ref 2.5–4.5)
PLATELET # BLD AUTO: 313 K/UL (ref 130–450)
PMV BLD AUTO: 12 FL (ref 7–12)
POTASSIUM SERPL-SCNC: 3.8 MMOL/L (ref 3.5–5)
RBC # BLD AUTO: 2.94 M/UL (ref 3.8–5.8)
SODIUM SERPL-SCNC: 141 MMOL/L (ref 132–146)
WBC OTHER # BLD: 12.9 K/UL (ref 4.5–11.5)

## 2024-10-12 PROCEDURE — 6370000000 HC RX 637 (ALT 250 FOR IP): Performed by: INTERNAL MEDICINE

## 2024-10-12 PROCEDURE — 83735 ASSAY OF MAGNESIUM: CPT

## 2024-10-12 PROCEDURE — 6370000000 HC RX 637 (ALT 250 FOR IP): Performed by: HOSPITALIST

## 2024-10-12 PROCEDURE — 84100 ASSAY OF PHOSPHORUS: CPT

## 2024-10-12 PROCEDURE — 6360000002 HC RX W HCPCS: Performed by: INTERNAL MEDICINE

## 2024-10-12 PROCEDURE — 2580000003 HC RX 258: Performed by: HOSPITALIST

## 2024-10-12 PROCEDURE — 82962 GLUCOSE BLOOD TEST: CPT

## 2024-10-12 PROCEDURE — 6370000000 HC RX 637 (ALT 250 FOR IP): Performed by: NURSE PRACTITIONER

## 2024-10-12 PROCEDURE — 85025 COMPLETE CBC W/AUTO DIFF WBC: CPT

## 2024-10-12 PROCEDURE — 2060000000 HC ICU INTERMEDIATE R&B

## 2024-10-12 PROCEDURE — 80048 BASIC METABOLIC PNL TOTAL CA: CPT

## 2024-10-12 RX ORDER — BUMETANIDE 0.25 MG/ML
1 INJECTION INTRAMUSCULAR; INTRAVENOUS 2 TIMES DAILY
Status: COMPLETED | OUTPATIENT
Start: 2024-10-12 | End: 2024-10-13

## 2024-10-12 RX ADMIN — CLONAZEPAM 0.5 MG: 0.5 TABLET ORAL at 18:16

## 2024-10-12 RX ADMIN — MIDODRINE HYDROCHLORIDE 10 MG: 5 TABLET ORAL at 16:33

## 2024-10-12 RX ADMIN — LEVOFLOXACIN 750 MG: 500 TABLET, FILM COATED ORAL at 15:45

## 2024-10-12 RX ADMIN — DIPHENOXYLATE HYDROCHLORIDE AND ATROPINE SULFATE 1 TABLET: 2.5; .025 TABLET ORAL at 21:09

## 2024-10-12 RX ADMIN — BUMETANIDE 1 MG: 0.25 INJECTION INTRAMUSCULAR; INTRAVENOUS at 18:04

## 2024-10-12 RX ADMIN — LEVOTHYROXINE SODIUM 75 MCG: 75 TABLET ORAL at 06:07

## 2024-10-12 RX ADMIN — SODIUM BICARBONATE 650 MG: 650 TABLET ORAL at 09:25

## 2024-10-12 RX ADMIN — DULOXETINE HYDROCHLORIDE 60 MG: 60 CAPSULE, DELAYED RELEASE ORAL at 09:26

## 2024-10-12 RX ADMIN — SODIUM BICARBONATE 650 MG: 650 TABLET ORAL at 21:09

## 2024-10-12 RX ADMIN — PANTOPRAZOLE SODIUM 40 MG: 40 INJECTION, POWDER, FOR SOLUTION INTRAVENOUS at 09:25

## 2024-10-12 RX ADMIN — HYDROCORTISONE 2.5%: 25 CREAM TOPICAL at 09:25

## 2024-10-12 RX ADMIN — PANCRELIPASE LIPASE, PANCRELIPASE PROTEASE, PANCRELIPASE AMYLASE 20000 UNITS: 20000; 63000; 84000 CAPSULE, DELAYED RELEASE ORAL at 09:26

## 2024-10-12 RX ADMIN — METRONIDAZOLE 500 MG: 500 TABLET ORAL at 15:45

## 2024-10-12 RX ADMIN — CETIRIZINE HYDROCHLORIDE 10 MG: 10 TABLET, FILM COATED ORAL at 09:26

## 2024-10-12 RX ADMIN — HYDROCORTISONE 2.5%: 25 CREAM TOPICAL at 21:09

## 2024-10-12 RX ADMIN — SODIUM CHLORIDE, PRESERVATIVE FREE 10 ML: 5 INJECTION INTRAVENOUS at 09:34

## 2024-10-12 RX ADMIN — DULOXETINE HYDROCHLORIDE 60 MG: 60 CAPSULE, DELAYED RELEASE ORAL at 16:33

## 2024-10-12 RX ADMIN — MIDODRINE HYDROCHLORIDE 10 MG: 5 TABLET ORAL at 11:35

## 2024-10-12 RX ADMIN — DIPHENOXYLATE HYDROCHLORIDE AND ATROPINE SULFATE 1 TABLET: 2.5; .025 TABLET ORAL at 15:45

## 2024-10-12 RX ADMIN — SODIUM CHLORIDE, PRESERVATIVE FREE 10 ML: 5 INJECTION INTRAVENOUS at 21:09

## 2024-10-12 RX ADMIN — MONTELUKAST 10 MG: 10 TABLET, FILM COATED ORAL at 09:25

## 2024-10-12 RX ADMIN — RIVAROXABAN 20 MG: 20 TABLET, FILM COATED ORAL at 16:33

## 2024-10-12 RX ADMIN — METRONIDAZOLE 500 MG: 500 TABLET ORAL at 09:26

## 2024-10-12 RX ADMIN — PANCRELIPASE LIPASE, PANCRELIPASE PROTEASE, PANCRELIPASE AMYLASE 20000 UNITS: 20000; 63000; 84000 CAPSULE, DELAYED RELEASE ORAL at 16:33

## 2024-10-12 RX ADMIN — PANTOPRAZOLE SODIUM 40 MG: 40 INJECTION, POWDER, FOR SOLUTION INTRAVENOUS at 21:09

## 2024-10-12 RX ADMIN — FINASTERIDE 5 MG: 5 TABLET, FILM COATED ORAL at 21:09

## 2024-10-12 RX ADMIN — ARIPIPRAZOLE 2 MG: 2 TABLET ORAL at 09:26

## 2024-10-12 RX ADMIN — MIDODRINE HYDROCHLORIDE 10 MG: 5 TABLET ORAL at 09:25

## 2024-10-12 RX ADMIN — DIPHENOXYLATE HYDROCHLORIDE AND ATROPINE SULFATE 1 TABLET: 2.5; .025 TABLET ORAL at 09:26

## 2024-10-12 RX ADMIN — PANCRELIPASE LIPASE, PANCRELIPASE PROTEASE, PANCRELIPASE AMYLASE 20000 UNITS: 20000; 63000; 84000 CAPSULE, DELAYED RELEASE ORAL at 11:41

## 2024-10-12 ASSESSMENT — PAIN SCALES - GENERAL: PAINLEVEL_OUTOF10: 0

## 2024-10-13 LAB
ANION GAP SERPL CALCULATED.3IONS-SCNC: 14 MMOL/L (ref 7–16)
BASOPHILS # BLD: 0.05 K/UL (ref 0–0.2)
BASOPHILS NFR BLD: 0 % (ref 0–2)
BUN SERPL-MCNC: 13 MG/DL (ref 6–23)
CALCIUM SERPL-MCNC: 8.2 MG/DL (ref 8.6–10.2)
CHLORIDE SERPL-SCNC: 108 MMOL/L (ref 98–107)
CO2 SERPL-SCNC: 21 MMOL/L (ref 22–29)
CREAT SERPL-MCNC: 1.9 MG/DL (ref 0.7–1.2)
EOSINOPHIL # BLD: 0.45 K/UL (ref 0.05–0.5)
EOSINOPHILS RELATIVE PERCENT: 3 % (ref 0–6)
ERYTHROCYTE [DISTWIDTH] IN BLOOD BY AUTOMATED COUNT: 19.2 % (ref 11.5–15)
GFR, ESTIMATED: 37 ML/MIN/1.73M2
GLUCOSE BLD-MCNC: 109 MG/DL (ref 74–99)
GLUCOSE BLD-MCNC: 113 MG/DL (ref 74–99)
GLUCOSE BLD-MCNC: 122 MG/DL (ref 74–99)
GLUCOSE BLD-MCNC: 96 MG/DL (ref 74–99)
GLUCOSE SERPL-MCNC: 103 MG/DL (ref 74–99)
HCT VFR BLD AUTO: 30.7 % (ref 37–54)
HGB BLD-MCNC: 9.7 G/DL (ref 12.5–16.5)
IMM GRANULOCYTES # BLD AUTO: 0.15 K/UL (ref 0–0.58)
IMM GRANULOCYTES NFR BLD: 1 % (ref 0–5)
LYMPHOCYTES NFR BLD: 1.99 K/UL (ref 1.5–4)
LYMPHOCYTES RELATIVE PERCENT: 15 % (ref 20–42)
MAGNESIUM SERPL-MCNC: 1.4 MG/DL (ref 1.6–2.6)
MCH RBC QN AUTO: 31.2 PG (ref 26–35)
MCHC RBC AUTO-ENTMCNC: 31.6 G/DL (ref 32–34.5)
MCV RBC AUTO: 98.7 FL (ref 80–99.9)
MONOCYTES NFR BLD: 1.58 K/UL (ref 0.1–0.95)
MONOCYTES NFR BLD: 12 % (ref 2–12)
NEUTROPHILS NFR BLD: 68 % (ref 43–80)
NEUTS SEG NFR BLD: 8.84 K/UL (ref 1.8–7.3)
PLATELET # BLD AUTO: 374 K/UL (ref 130–450)
PMV BLD AUTO: 12 FL (ref 7–12)
POTASSIUM SERPL-SCNC: 3.5 MMOL/L (ref 3.5–5)
RBC # BLD AUTO: 3.11 M/UL (ref 3.8–5.8)
RBC # BLD: ABNORMAL 10*6/UL
SODIUM SERPL-SCNC: 143 MMOL/L (ref 132–146)
WBC OTHER # BLD: 13.1 K/UL (ref 4.5–11.5)

## 2024-10-13 PROCEDURE — 6370000000 HC RX 637 (ALT 250 FOR IP): Performed by: NURSE PRACTITIONER

## 2024-10-13 PROCEDURE — 82962 GLUCOSE BLOOD TEST: CPT

## 2024-10-13 PROCEDURE — 6370000000 HC RX 637 (ALT 250 FOR IP): Performed by: INTERNAL MEDICINE

## 2024-10-13 PROCEDURE — 6360000002 HC RX W HCPCS: Performed by: INTERNAL MEDICINE

## 2024-10-13 PROCEDURE — 83735 ASSAY OF MAGNESIUM: CPT

## 2024-10-13 PROCEDURE — 80048 BASIC METABOLIC PNL TOTAL CA: CPT

## 2024-10-13 PROCEDURE — 2060000000 HC ICU INTERMEDIATE R&B

## 2024-10-13 PROCEDURE — 6370000000 HC RX 637 (ALT 250 FOR IP): Performed by: HOSPITALIST

## 2024-10-13 PROCEDURE — 2580000003 HC RX 258: Performed by: HOSPITALIST

## 2024-10-13 PROCEDURE — 85025 COMPLETE CBC W/AUTO DIFF WBC: CPT

## 2024-10-13 RX ORDER — MAGNESIUM SULFATE 1 G/100ML
1000 INJECTION INTRAVENOUS ONCE
Status: COMPLETED | OUTPATIENT
Start: 2024-10-13 | End: 2024-10-13

## 2024-10-13 RX ORDER — LANOLIN ALCOHOL/MO/W.PET/CERES
400 CREAM (GRAM) TOPICAL DAILY
Status: DISCONTINUED | OUTPATIENT
Start: 2024-10-13 | End: 2024-10-15 | Stop reason: HOSPADM

## 2024-10-13 RX ADMIN — SODIUM CHLORIDE, PRESERVATIVE FREE 10 ML: 5 INJECTION INTRAVENOUS at 20:25

## 2024-10-13 RX ADMIN — HYDROCORTISONE 2.5%: 25 CREAM TOPICAL at 08:38

## 2024-10-13 RX ADMIN — ONDANSETRON 4 MG: 4 TABLET, ORALLY DISINTEGRATING ORAL at 14:07

## 2024-10-13 RX ADMIN — METRONIDAZOLE 500 MG: 500 TABLET ORAL at 16:08

## 2024-10-13 RX ADMIN — MAGNESIUM SULFATE HEPTAHYDRATE 1000 MG: 10 INJECTION, SOLUTION INTRAVENOUS at 09:24

## 2024-10-13 RX ADMIN — DIPHENOXYLATE HYDROCHLORIDE AND ATROPINE SULFATE 1 TABLET: 2.5; .025 TABLET ORAL at 20:26

## 2024-10-13 RX ADMIN — HYDROCORTISONE 2.5%: 25 CREAM TOPICAL at 20:27

## 2024-10-13 RX ADMIN — CETIRIZINE HYDROCHLORIDE 10 MG: 10 TABLET, FILM COATED ORAL at 08:25

## 2024-10-13 RX ADMIN — LEVOTHYROXINE SODIUM 75 MCG: 75 TABLET ORAL at 06:38

## 2024-10-13 RX ADMIN — MONTELUKAST 10 MG: 10 TABLET, FILM COATED ORAL at 08:25

## 2024-10-13 RX ADMIN — MIDODRINE HYDROCHLORIDE 10 MG: 5 TABLET ORAL at 11:08

## 2024-10-13 RX ADMIN — SODIUM BICARBONATE 650 MG: 650 TABLET ORAL at 20:26

## 2024-10-13 RX ADMIN — PANCRELIPASE LIPASE, PANCRELIPASE PROTEASE, PANCRELIPASE AMYLASE 20000 UNITS: 20000; 63000; 84000 CAPSULE, DELAYED RELEASE ORAL at 08:25

## 2024-10-13 RX ADMIN — METRONIDAZOLE 500 MG: 500 TABLET ORAL at 01:05

## 2024-10-13 RX ADMIN — DIPHENOXYLATE HYDROCHLORIDE AND ATROPINE SULFATE 1 TABLET: 2.5; .025 TABLET ORAL at 08:23

## 2024-10-13 RX ADMIN — BUMETANIDE 1 MG: 0.25 INJECTION INTRAMUSCULAR; INTRAVENOUS at 08:27

## 2024-10-13 RX ADMIN — METRONIDAZOLE 500 MG: 500 TABLET ORAL at 08:24

## 2024-10-13 RX ADMIN — PETROLATUM: 420 OINTMENT TOPICAL at 16:07

## 2024-10-13 RX ADMIN — PANTOPRAZOLE SODIUM 40 MG: 40 INJECTION, POWDER, FOR SOLUTION INTRAVENOUS at 08:27

## 2024-10-13 RX ADMIN — DULOXETINE HYDROCHLORIDE 60 MG: 60 CAPSULE, DELAYED RELEASE ORAL at 16:08

## 2024-10-13 RX ADMIN — MAGNESIUM OXIDE 400 MG (241.3 MG MAGNESIUM) TABLET 400 MG: TABLET at 09:20

## 2024-10-13 RX ADMIN — DIPHENOXYLATE HYDROCHLORIDE AND ATROPINE SULFATE 1 TABLET: 2.5; .025 TABLET ORAL at 16:09

## 2024-10-13 RX ADMIN — CLONAZEPAM 0.5 MG: 0.5 TABLET ORAL at 20:26

## 2024-10-13 RX ADMIN — MIDODRINE HYDROCHLORIDE 10 MG: 5 TABLET ORAL at 16:08

## 2024-10-13 RX ADMIN — SODIUM CHLORIDE, PRESERVATIVE FREE 10 ML: 5 INJECTION INTRAVENOUS at 08:39

## 2024-10-13 RX ADMIN — ARIPIPRAZOLE 2 MG: 2 TABLET ORAL at 08:24

## 2024-10-13 RX ADMIN — PANCRELIPASE LIPASE, PANCRELIPASE PROTEASE, PANCRELIPASE AMYLASE 20000 UNITS: 20000; 63000; 84000 CAPSULE, DELAYED RELEASE ORAL at 16:08

## 2024-10-13 RX ADMIN — DULOXETINE HYDROCHLORIDE 60 MG: 60 CAPSULE, DELAYED RELEASE ORAL at 08:24

## 2024-10-13 RX ADMIN — CLONAZEPAM 0.5 MG: 0.5 TABLET ORAL at 08:31

## 2024-10-13 RX ADMIN — RIVAROXABAN 20 MG: 20 TABLET, FILM COATED ORAL at 16:57

## 2024-10-13 RX ADMIN — SODIUM BICARBONATE 650 MG: 650 TABLET ORAL at 08:25

## 2024-10-13 RX ADMIN — FINASTERIDE 5 MG: 5 TABLET, FILM COATED ORAL at 20:26

## 2024-10-13 RX ADMIN — PANTOPRAZOLE SODIUM 40 MG: 40 INJECTION, POWDER, FOR SOLUTION INTRAVENOUS at 20:25

## 2024-10-13 RX ADMIN — MIDODRINE HYDROCHLORIDE 10 MG: 5 TABLET ORAL at 08:49

## 2024-10-13 ASSESSMENT — PAIN SCALES - GENERAL
PAINLEVEL_OUTOF10: 0
PAINLEVEL_OUTOF10: 0

## 2024-10-13 NOTE — PLAN OF CARE
Problem: Chronic Conditions and Co-morbidities  Goal: Patient's chronic conditions and co-morbidity symptoms are monitored and maintained or improved  10/13/2024 1126 by Keri Barakat RN  Outcome: Progressing     Problem: Discharge Planning  Goal: Discharge to home or other facility with appropriate resources  10/13/2024 1126 by Keri Barakat RN  Outcome: Progressing     Problem: Pain  Goal: Verbalizes/displays adequate comfort level or baseline comfort level  10/13/2024 1126 by Keri Barakat RN  Outcome: Progressing     Problem: Skin/Tissue Integrity  Goal: Absence of new skin breakdown  Description: 1.  Monitor for areas of redness and/or skin breakdown  2.  Assess vascular access sites hourly  3.  Every 4-6 hours minimum:  Change oxygen saturation probe site  4.  Every 4-6 hours:  If on nasal continuous positive airway pressure, respiratory therapy assess nares and determine need for appliance change or resting period.  10/13/2024 1126 by Keri Barakat RN  Outcome: Progressing     Problem: Safety - Adult  Goal: Free from fall injury  10/13/2024 1126 by Keri Barakat RN  Outcome: Progressing     Problem: ABCDS Injury Assessment  Goal: Absence of physical injury  10/13/2024 1126 by Keri Barakat RN  Outcome: Progressing     Problem: Nutrition Deficit:  Goal: Optimize nutritional status  10/13/2024 1126 by Keri Barakat RN  Outcome: Progressing

## 2024-10-13 NOTE — PLAN OF CARE
Problem: Chronic Conditions and Co-morbidities  Goal: Patient's chronic conditions and co-morbidity symptoms are monitored and maintained or improved  Outcome: Progressing  Flowsheets (Taken 10/12/2024 2100)  Care Plan - Patient's Chronic Conditions and Co-Morbidity Symptoms are Monitored and Maintained or Improved: Monitor and assess patient's chronic conditions and comorbid symptoms for stability, deterioration, or improvement     Problem: Discharge Planning  Goal: Discharge to home or other facility with appropriate resources  Outcome: Progressing  Flowsheets (Taken 10/12/2024 2100)  Discharge to home or other facility with appropriate resources: Identify barriers to discharge with patient and caregiver     Problem: Pain  Goal: Verbalizes/displays adequate comfort level or baseline comfort level  Outcome: Progressing     Problem: Skin/Tissue Integrity  Goal: Absence of new skin breakdown  Description: 1.  Monitor for areas of redness and/or skin breakdown  2.  Assess vascular access sites hourly  3.  Every 4-6 hours minimum:  Change oxygen saturation probe site  4.  Every 4-6 hours:  If on nasal continuous positive airway pressure, respiratory therapy assess nares and determine need for appliance change or resting period.  Outcome: Progressing     Problem: Safety - Adult  Goal: Free from fall injury  Outcome: Progressing     Problem: ABCDS Injury Assessment  Goal: Absence of physical injury  Outcome: Progressing     Problem: Nutrition Deficit:  Goal: Optimize nutritional status  Outcome: Progressing

## 2024-10-14 LAB
ANION GAP SERPL CALCULATED.3IONS-SCNC: 13 MMOL/L (ref 7–16)
BASOPHILS # BLD: 0.06 K/UL (ref 0–0.2)
BASOPHILS NFR BLD: 1 % (ref 0–2)
BUN SERPL-MCNC: 13 MG/DL (ref 6–23)
CALCIUM SERPL-MCNC: 8.3 MG/DL (ref 8.6–10.2)
CHLORIDE SERPL-SCNC: 107 MMOL/L (ref 98–107)
CO2 SERPL-SCNC: 22 MMOL/L (ref 22–29)
CREAT SERPL-MCNC: 1.8 MG/DL (ref 0.7–1.2)
EOSINOPHIL # BLD: 0.52 K/UL (ref 0.05–0.5)
EOSINOPHILS RELATIVE PERCENT: 4 % (ref 0–6)
ERYTHROCYTE [DISTWIDTH] IN BLOOD BY AUTOMATED COUNT: 19.2 % (ref 11.5–15)
GFR, ESTIMATED: 39 ML/MIN/1.73M2
GLUCOSE BLD-MCNC: 105 MG/DL (ref 74–99)
GLUCOSE BLD-MCNC: 124 MG/DL (ref 74–99)
GLUCOSE BLD-MCNC: 86 MG/DL (ref 74–99)
GLUCOSE SERPL-MCNC: 98 MG/DL (ref 74–99)
HCT VFR BLD AUTO: 32.4 % (ref 37–54)
HGB BLD-MCNC: 10 G/DL (ref 12.5–16.5)
IMM GRANULOCYTES # BLD AUTO: 0.15 K/UL (ref 0–0.58)
IMM GRANULOCYTES NFR BLD: 1 % (ref 0–5)
LYMPHOCYTES NFR BLD: 2.54 K/UL (ref 1.5–4)
LYMPHOCYTES RELATIVE PERCENT: 21 % (ref 20–42)
MAGNESIUM SERPL-MCNC: 1.5 MG/DL (ref 1.6–2.6)
MCH RBC QN AUTO: 30.9 PG (ref 26–35)
MCHC RBC AUTO-ENTMCNC: 30.9 G/DL (ref 32–34.5)
MCV RBC AUTO: 100 FL (ref 80–99.9)
MONOCYTES NFR BLD: 1.74 K/UL (ref 0.1–0.95)
MONOCYTES NFR BLD: 14 % (ref 2–12)
NEUTROPHILS NFR BLD: 59 % (ref 43–80)
NEUTS SEG NFR BLD: 7.34 K/UL (ref 1.8–7.3)
PLATELET # BLD AUTO: 420 K/UL (ref 130–450)
PMV BLD AUTO: 12.1 FL (ref 7–12)
POTASSIUM SERPL-SCNC: 3.2 MMOL/L (ref 3.5–5)
RBC # BLD AUTO: 3.24 M/UL (ref 3.8–5.8)
RBC # BLD: ABNORMAL 10*6/UL
SODIUM SERPL-SCNC: 142 MMOL/L (ref 132–146)
WBC OTHER # BLD: 12.4 K/UL (ref 4.5–11.5)

## 2024-10-14 PROCEDURE — 97530 THERAPEUTIC ACTIVITIES: CPT

## 2024-10-14 PROCEDURE — 6370000000 HC RX 637 (ALT 250 FOR IP): Performed by: INTERNAL MEDICINE

## 2024-10-14 PROCEDURE — 6370000000 HC RX 637 (ALT 250 FOR IP): Performed by: NURSE PRACTITIONER

## 2024-10-14 PROCEDURE — 2580000003 HC RX 258: Performed by: HOSPITALIST

## 2024-10-14 PROCEDURE — 6360000002 HC RX W HCPCS: Performed by: HOSPITALIST

## 2024-10-14 PROCEDURE — 82962 GLUCOSE BLOOD TEST: CPT

## 2024-10-14 PROCEDURE — 83735 ASSAY OF MAGNESIUM: CPT

## 2024-10-14 PROCEDURE — 85025 COMPLETE CBC W/AUTO DIFF WBC: CPT

## 2024-10-14 PROCEDURE — 2060000000 HC ICU INTERMEDIATE R&B

## 2024-10-14 PROCEDURE — 6360000002 HC RX W HCPCS: Performed by: INTERNAL MEDICINE

## 2024-10-14 PROCEDURE — 36415 COLL VENOUS BLD VENIPUNCTURE: CPT

## 2024-10-14 PROCEDURE — 80048 BASIC METABOLIC PNL TOTAL CA: CPT

## 2024-10-14 PROCEDURE — 6370000000 HC RX 637 (ALT 250 FOR IP): Performed by: HOSPITALIST

## 2024-10-14 RX ORDER — POTASSIUM CHLORIDE 1500 MG/1
40 TABLET, EXTENDED RELEASE ORAL PRN
Status: DISCONTINUED | OUTPATIENT
Start: 2024-10-14 | End: 2024-10-15 | Stop reason: HOSPADM

## 2024-10-14 RX ORDER — POTASSIUM CHLORIDE 7.45 MG/ML
10 INJECTION INTRAVENOUS PRN
Status: DISCONTINUED | OUTPATIENT
Start: 2024-10-14 | End: 2024-10-15 | Stop reason: HOSPADM

## 2024-10-14 RX ORDER — MAGNESIUM SULFATE IN WATER 40 MG/ML
2000 INJECTION, SOLUTION INTRAVENOUS PRN
Status: DISCONTINUED | OUTPATIENT
Start: 2024-10-14 | End: 2024-10-15 | Stop reason: HOSPADM

## 2024-10-14 RX ADMIN — LEVOTHYROXINE SODIUM 75 MCG: 75 TABLET ORAL at 06:29

## 2024-10-14 RX ADMIN — LEVOFLOXACIN 750 MG: 500 TABLET, FILM COATED ORAL at 15:43

## 2024-10-14 RX ADMIN — SODIUM CHLORIDE, PRESERVATIVE FREE 10 ML: 5 INJECTION INTRAVENOUS at 20:36

## 2024-10-14 RX ADMIN — METRONIDAZOLE 500 MG: 500 TABLET ORAL at 08:06

## 2024-10-14 RX ADMIN — SODIUM BICARBONATE 650 MG: 650 TABLET ORAL at 20:36

## 2024-10-14 RX ADMIN — PANTOPRAZOLE SODIUM 40 MG: 40 INJECTION, POWDER, FOR SOLUTION INTRAVENOUS at 08:14

## 2024-10-14 RX ADMIN — CLONAZEPAM 0.5 MG: 0.5 TABLET ORAL at 20:36

## 2024-10-14 RX ADMIN — PANCRELIPASE LIPASE, PANCRELIPASE PROTEASE, PANCRELIPASE AMYLASE 20000 UNITS: 20000; 63000; 84000 CAPSULE, DELAYED RELEASE ORAL at 15:44

## 2024-10-14 RX ADMIN — SODIUM CHLORIDE, PRESERVATIVE FREE 10 ML: 5 INJECTION INTRAVENOUS at 08:14

## 2024-10-14 RX ADMIN — HYDROCORTISONE 2.5%: 25 CREAM TOPICAL at 20:36

## 2024-10-14 RX ADMIN — DIPHENOXYLATE HYDROCHLORIDE AND ATROPINE SULFATE 1 TABLET: 2.5; .025 TABLET ORAL at 20:36

## 2024-10-14 RX ADMIN — DIPHENOXYLATE HYDROCHLORIDE AND ATROPINE SULFATE 1 TABLET: 2.5; .025 TABLET ORAL at 08:06

## 2024-10-14 RX ADMIN — MONTELUKAST 10 MG: 10 TABLET, FILM COATED ORAL at 08:06

## 2024-10-14 RX ADMIN — MIDODRINE HYDROCHLORIDE 10 MG: 5 TABLET ORAL at 15:45

## 2024-10-14 RX ADMIN — DULOXETINE HYDROCHLORIDE 60 MG: 60 CAPSULE, DELAYED RELEASE ORAL at 08:06

## 2024-10-14 RX ADMIN — DULOXETINE HYDROCHLORIDE 60 MG: 60 CAPSULE, DELAYED RELEASE ORAL at 15:45

## 2024-10-14 RX ADMIN — PANCRELIPASE LIPASE, PANCRELIPASE PROTEASE, PANCRELIPASE AMYLASE 20000 UNITS: 20000; 63000; 84000 CAPSULE, DELAYED RELEASE ORAL at 06:29

## 2024-10-14 RX ADMIN — ARIPIPRAZOLE 2 MG: 2 TABLET ORAL at 08:06

## 2024-10-14 RX ADMIN — METRONIDAZOLE 500 MG: 500 TABLET ORAL at 15:44

## 2024-10-14 RX ADMIN — MAGNESIUM OXIDE 400 MG (241.3 MG MAGNESIUM) TABLET 400 MG: TABLET at 08:06

## 2024-10-14 RX ADMIN — METRONIDAZOLE 500 MG: 500 TABLET ORAL at 01:10

## 2024-10-14 RX ADMIN — FINASTERIDE 5 MG: 5 TABLET, FILM COATED ORAL at 20:36

## 2024-10-14 RX ADMIN — SODIUM BICARBONATE 650 MG: 650 TABLET ORAL at 10:55

## 2024-10-14 RX ADMIN — PANCRELIPASE LIPASE, PANCRELIPASE PROTEASE, PANCRELIPASE AMYLASE 20000 UNITS: 20000; 63000; 84000 CAPSULE, DELAYED RELEASE ORAL at 10:56

## 2024-10-14 RX ADMIN — PETROLATUM: 420 OINTMENT TOPICAL at 20:36

## 2024-10-14 RX ADMIN — POTASSIUM CHLORIDE 40 MEQ: 1500 TABLET, EXTENDED RELEASE ORAL at 08:13

## 2024-10-14 RX ADMIN — MIDODRINE HYDROCHLORIDE 10 MG: 5 TABLET ORAL at 08:05

## 2024-10-14 RX ADMIN — MIDODRINE HYDROCHLORIDE 10 MG: 5 TABLET ORAL at 10:56

## 2024-10-14 RX ADMIN — PANTOPRAZOLE SODIUM 40 MG: 40 INJECTION, POWDER, FOR SOLUTION INTRAVENOUS at 20:36

## 2024-10-14 RX ADMIN — DIPHENOXYLATE HYDROCHLORIDE AND ATROPINE SULFATE 1 TABLET: 2.5; .025 TABLET ORAL at 15:44

## 2024-10-14 RX ADMIN — MAGNESIUM SULFATE HEPTAHYDRATE 2000 MG: 40 INJECTION, SOLUTION INTRAVENOUS at 08:22

## 2024-10-14 RX ADMIN — RIVAROXABAN 20 MG: 20 TABLET, FILM COATED ORAL at 15:45

## 2024-10-14 RX ADMIN — HYDROCORTISONE 2.5%: 25 CREAM TOPICAL at 08:07

## 2024-10-14 RX ADMIN — CETIRIZINE HYDROCHLORIDE 10 MG: 10 TABLET, FILM COATED ORAL at 08:05

## 2024-10-14 ASSESSMENT — PAIN SCALES - GENERAL: PAINLEVEL_OUTOF10: 0

## 2024-10-15 VITALS
HEART RATE: 84 BPM | OXYGEN SATURATION: 98 % | TEMPERATURE: 97.9 F | SYSTOLIC BLOOD PRESSURE: 118 MMHG | WEIGHT: 178.4 LBS | RESPIRATION RATE: 18 BRPM | BODY MASS INDEX: 26.42 KG/M2 | HEIGHT: 69 IN | DIASTOLIC BLOOD PRESSURE: 66 MMHG

## 2024-10-15 LAB
ANION GAP SERPL CALCULATED.3IONS-SCNC: 11 MMOL/L (ref 7–16)
BASOPHILS # BLD: 0.04 K/UL (ref 0–0.2)
BASOPHILS NFR BLD: 0 % (ref 0–2)
BUN SERPL-MCNC: 12 MG/DL (ref 6–23)
CALCIUM SERPL-MCNC: 8.6 MG/DL (ref 8.6–10.2)
CHLORIDE SERPL-SCNC: 110 MMOL/L (ref 98–107)
CO2 SERPL-SCNC: 22 MMOL/L (ref 22–29)
CREAT SERPL-MCNC: 2 MG/DL (ref 0.7–1.2)
EOSINOPHIL # BLD: 0.53 K/UL (ref 0.05–0.5)
EOSINOPHILS RELATIVE PERCENT: 5 % (ref 0–6)
ERYTHROCYTE [DISTWIDTH] IN BLOOD BY AUTOMATED COUNT: 19 % (ref 11.5–15)
GFR, ESTIMATED: 35 ML/MIN/1.73M2
GLUCOSE BLD-MCNC: 88 MG/DL (ref 74–99)
GLUCOSE BLD-MCNC: 98 MG/DL (ref 74–99)
GLUCOSE SERPL-MCNC: 108 MG/DL (ref 74–99)
HCT VFR BLD AUTO: 31.1 % (ref 37–54)
HGB BLD-MCNC: 9.7 G/DL (ref 12.5–16.5)
IMM GRANULOCYTES # BLD AUTO: 0.11 K/UL (ref 0–0.58)
IMM GRANULOCYTES NFR BLD: 1 % (ref 0–5)
LYMPHOCYTES NFR BLD: 2.15 K/UL (ref 1.5–4)
LYMPHOCYTES RELATIVE PERCENT: 20 % (ref 20–42)
MCH RBC QN AUTO: 30.8 PG (ref 26–35)
MCHC RBC AUTO-ENTMCNC: 31.2 G/DL (ref 32–34.5)
MCV RBC AUTO: 98.7 FL (ref 80–99.9)
MONOCYTES NFR BLD: 1.47 K/UL (ref 0.1–0.95)
MONOCYTES NFR BLD: 14 % (ref 2–12)
NEUTROPHILS NFR BLD: 59 % (ref 43–80)
NEUTS SEG NFR BLD: 6.22 K/UL (ref 1.8–7.3)
PLATELET # BLD AUTO: 463 K/UL (ref 130–450)
PMV BLD AUTO: 11.4 FL (ref 7–12)
POTASSIUM SERPL-SCNC: 3.7 MMOL/L (ref 3.5–5)
RBC # BLD AUTO: 3.15 M/UL (ref 3.8–5.8)
SODIUM SERPL-SCNC: 143 MMOL/L (ref 132–146)
WBC OTHER # BLD: 10.5 K/UL (ref 4.5–11.5)

## 2024-10-15 PROCEDURE — 2580000003 HC RX 258: Performed by: HOSPITALIST

## 2024-10-15 PROCEDURE — 6370000000 HC RX 637 (ALT 250 FOR IP): Performed by: INTERNAL MEDICINE

## 2024-10-15 PROCEDURE — 85025 COMPLETE CBC W/AUTO DIFF WBC: CPT

## 2024-10-15 PROCEDURE — 97530 THERAPEUTIC ACTIVITIES: CPT

## 2024-10-15 PROCEDURE — 6370000000 HC RX 637 (ALT 250 FOR IP): Performed by: HOSPITALIST

## 2024-10-15 PROCEDURE — 36415 COLL VENOUS BLD VENIPUNCTURE: CPT

## 2024-10-15 PROCEDURE — 6370000000 HC RX 637 (ALT 250 FOR IP): Performed by: NURSE PRACTITIONER

## 2024-10-15 PROCEDURE — 6360000002 HC RX W HCPCS: Performed by: INTERNAL MEDICINE

## 2024-10-15 PROCEDURE — 80048 BASIC METABOLIC PNL TOTAL CA: CPT

## 2024-10-15 PROCEDURE — 82962 GLUCOSE BLOOD TEST: CPT

## 2024-10-15 RX ORDER — MIDODRINE HYDROCHLORIDE 10 MG/1
10 TABLET ORAL
Qty: 90 TABLET | Refills: 3 | Status: SHIPPED | OUTPATIENT
Start: 2024-10-15

## 2024-10-15 RX ORDER — METRONIDAZOLE 500 MG/1
500 TABLET ORAL EVERY 8 HOURS
Qty: 30 TABLET | Refills: 0 | Status: SHIPPED | OUTPATIENT
Start: 2024-10-15 | End: 2024-10-25

## 2024-10-15 RX ORDER — CLONAZEPAM 0.5 MG/1
0.5 TABLET ORAL EVERY 12 HOURS PRN
Qty: 20 TABLET | Refills: 0 | Status: SHIPPED | OUTPATIENT
Start: 2024-10-15 | End: 2024-10-25

## 2024-10-15 RX ORDER — HYDROCORTISONE 25 MG/G
CREAM TOPICAL
Qty: 30 G | Refills: 1 | Status: SHIPPED | OUTPATIENT
Start: 2024-10-15

## 2024-10-15 RX ORDER — LEVOFLOXACIN 750 MG/1
750 TABLET, FILM COATED ORAL
Qty: 5 TABLET | Refills: 0 | Status: SHIPPED | OUTPATIENT
Start: 2024-10-16 | End: 2024-10-26

## 2024-10-15 RX ORDER — DIPHENOXYLATE HCL/ATROPINE 2.5-.025MG
1 TABLET ORAL 3 TIMES DAILY
Qty: 30 TABLET | Refills: 0 | Status: SHIPPED | OUTPATIENT
Start: 2024-10-15 | End: 2024-10-25

## 2024-10-15 RX ORDER — SODIUM BICARBONATE 650 MG/1
650 TABLET ORAL 2 TIMES DAILY
Qty: 60 TABLET | Refills: 0 | Status: SHIPPED | OUTPATIENT
Start: 2024-10-15

## 2024-10-15 RX ADMIN — CETIRIZINE HYDROCHLORIDE 10 MG: 10 TABLET, FILM COATED ORAL at 07:56

## 2024-10-15 RX ADMIN — HYDROCORTISONE 2.5%: 25 CREAM TOPICAL at 07:58

## 2024-10-15 RX ADMIN — SODIUM CHLORIDE, PRESERVATIVE FREE 10 ML: 5 INJECTION INTRAVENOUS at 07:56

## 2024-10-15 RX ADMIN — METRONIDAZOLE 500 MG: 500 TABLET ORAL at 00:11

## 2024-10-15 RX ADMIN — DIPHENOXYLATE HYDROCHLORIDE AND ATROPINE SULFATE 1 TABLET: 2.5; .025 TABLET ORAL at 07:56

## 2024-10-15 RX ADMIN — DULOXETINE HYDROCHLORIDE 60 MG: 60 CAPSULE, DELAYED RELEASE ORAL at 07:56

## 2024-10-15 RX ADMIN — MIDODRINE HYDROCHLORIDE 10 MG: 5 TABLET ORAL at 11:06

## 2024-10-15 RX ADMIN — PANTOPRAZOLE SODIUM 40 MG: 40 INJECTION, POWDER, FOR SOLUTION INTRAVENOUS at 07:56

## 2024-10-15 RX ADMIN — MIDODRINE HYDROCHLORIDE 10 MG: 5 TABLET ORAL at 07:56

## 2024-10-15 RX ADMIN — PANCRELIPASE LIPASE, PANCRELIPASE PROTEASE, PANCRELIPASE AMYLASE 20000 UNITS: 20000; 63000; 84000 CAPSULE, DELAYED RELEASE ORAL at 06:08

## 2024-10-15 RX ADMIN — METRONIDAZOLE 500 MG: 500 TABLET ORAL at 07:56

## 2024-10-15 RX ADMIN — LEVOTHYROXINE SODIUM 75 MCG: 75 TABLET ORAL at 06:08

## 2024-10-15 RX ADMIN — PANCRELIPASE LIPASE, PANCRELIPASE PROTEASE, PANCRELIPASE AMYLASE 20000 UNITS: 20000; 63000; 84000 CAPSULE, DELAYED RELEASE ORAL at 11:06

## 2024-10-15 RX ADMIN — ARIPIPRAZOLE 2 MG: 2 TABLET ORAL at 07:56

## 2024-10-15 RX ADMIN — SODIUM BICARBONATE 650 MG: 650 TABLET ORAL at 07:56

## 2024-10-15 RX ADMIN — PETROLATUM: 420 OINTMENT TOPICAL at 07:56

## 2024-10-15 RX ADMIN — DIPHENOXYLATE HYDROCHLORIDE AND ATROPINE SULFATE 1 TABLET: 2.5; .025 TABLET ORAL at 13:59

## 2024-10-15 RX ADMIN — CLONAZEPAM 0.5 MG: 0.5 TABLET ORAL at 08:10

## 2024-10-15 RX ADMIN — MONTELUKAST 10 MG: 10 TABLET, FILM COATED ORAL at 07:57

## 2024-10-15 RX ADMIN — MAGNESIUM OXIDE 400 MG (241.3 MG MAGNESIUM) TABLET 400 MG: TABLET at 07:56

## 2024-10-15 ASSESSMENT — PAIN SCALES - GENERAL: PAINLEVEL_OUTOF10: 0

## 2024-10-15 NOTE — DISCHARGE SUMMARY
Internal Medicine Discharge Summary    NAME: Anuel Kwok :  1950  MRN:  [id number] PCP:Valentín Christianson MD    ADMITTED: 10/3/2024   DISCHARGED: No discharge date for patient encounter.    ADMITTING PHYSICIAN: Delio Granda MD    PCP: Valentín Christianson MD    CONSULTANT(S):   IP CONSULT TO GI  IP CONSULT TO INTERNAL MEDICINE  IP CONSULT TO NEPHROLOGY  IP CONSULT TO VASCULAR ACCESS TEAM  IP CONSULT TO VASCULAR ACCESS TEAM  IP CONSULT TO VASCULAR ACCESS TEAM  IP CONSULT TO CARDIOLOGY  IP CONSULT TO HOME CARE NEEDS     ADMITTING DIAGNOSIS:   GI bleed [K92.2]  Lower GI bleed [K92.2]     Please see H&P for further details    DISCHARGE DIAGNOSES:   Active Hospital Problems    Diagnosis     GERD (gastroesophageal reflux disease) [K21.9]      Priority: High    Anemia [D64.9]      Priority: High    Chronic diarrhea [K52.9]      Priority: High    Hypotension [I95.9]      Priority: Medium    Depression [F32.A]      Priority: Medium    Protein calorie malnutrition (HCC) [E46]      Priority: Medium    Anasarca [R60.1]     GI bleed [K92.2]     Acute kidney injury (HCC) [N17.9]     Hematochezia [K92.1]     MDD (major depressive disorder) [F32.9]     Acute hyperglycemia [R73.9]     Status post limb salvage procedures [Z98.890]     Hypothyroid [E03.9]     Hyperlipemia [E78.5]     Anxiety state [F41.1]        BRIEF HISTORY OF PRESENT ILLNESS: Anuel Kwok is a 74 y.o. male patient of Valentín Christianson MD who  has a past medical history of Anxiety, Anxiety state, unspecified, Depression, Fracture cervical vertebra-closed (HCC), Fracture of rib, GERD (gastroesophageal reflux disease), GERD (gastroesophageal reflux disease), Hyperlipidemia, Hyperthyroidism, Hypothyroidism, Irritable bowel syndrome, Nonunion of fracture, Pneumonia, Sacral fracture (HCC), Thyroid disease, Tibia/fibula fracture, Type II or unspecified type diabetes mellitus without mention of complication, not stated as uncontrolled, and Urinary

## 2024-10-15 NOTE — CARE COORDINATION
Social Work/Discharge Planning:  Discharge order noted.  [facility] order noted.  Patient to return home with his wife.  Notified Lorene with [facility] [place] of discharge order.  Received notification patient will need a bedside commode.  Called patient wife Alvina and provided her with an update of above.  Alvina prefers to have bedside commode delivered to their home and she will use [facility].  Placed order for bedside commode with Rafi from [facility] and he states they will deliver tomorrow morning to home address.  Updated patient wife.  Electronically signed by CHATO Yuan on 10/15/2024 at 3:04 PM

## 2024-10-15 NOTE — PROGRESS NOTES
Subjective:    Patient continues to complain of dizziness    Feels extremely weak    States he feels worse than yesterday    Objective:    BP (!) 131/51   Pulse 87   Temp 98.3 °F (36.8 °C) (Oral)   Resp 20   Ht 1.753 m (5' 9\")   Wt 88 kg (194 lb)   SpO2 98%   BMI 28.65 kg/m²   Gen: Alert and in no apparent distress   Skin: Warm and dry  Neck: Supple, no JVD  Heart:  RRR, no murmurs, gallops, or rubs.  Lungs:  CTA bilaterally, no wheeze, rales or rhonchi  Abd: bowel sounds present, nontender, nondistended, no masses  Extrem:  No clubbing, cyanosis, or edema, pulses intact    I/O last 3 completed shifts:  In: 240 [P.O.:240]  Out: 2300 [Urine:2300]    Laboratory:     CBC with Differential:    Lab Results   Component Value Date/Time    WBC 12.9 10/12/2024 04:10 AM    RBC 2.94 10/12/2024 04:10 AM    HGB 9.0 10/12/2024 04:10 AM    HCT 29.7 10/12/2024 04:10 AM     10/12/2024 04:10 AM    .0 10/12/2024 04:10 AM    MCH 30.6 10/12/2024 04:10 AM    MCHC 30.3 10/12/2024 04:10 AM    RDW 19.3 10/12/2024 04:10 AM    NRBC 2 11/13/2013 04:40 AM    METASPCT 1 11/11/2013 03:31 AM    LYMPHOPCT 16 10/12/2024 04:10 AM    MONOPCT 12 10/12/2024 04:10 AM    MYELOPCT 1 11/04/2013 03:00 AM    EOSPCT 6 10/12/2024 04:10 AM    BASOPCT 0 10/12/2024 04:10 AM    MONOSABS 1.50 10/12/2024 04:10 AM    LYMPHSABS 2.10 10/12/2024 04:10 AM    EOSABS 0.78 10/12/2024 04:10 AM    BASOSABS 0.05 10/12/2024 04:10 AM     CMP:    Lab Results   Component Value Date/Time     10/12/2024 04:10 AM    K 3.8 10/12/2024 04:10 AM     10/12/2024 04:10 AM    CO2 19 10/12/2024 04:10 AM    BUN 13 10/12/2024 04:10 AM    CREATININE 1.8 10/12/2024 04:10 AM    GFRAA 46 08/30/2016 11:41 AM    LABGLOM 39 10/12/2024 04:10 AM    GLUCOSE 97 10/12/2024 04:10 AM    CALCIUM 7.9 10/12/2024 04:10 AM    BILITOT 0.3 10/07/2024 05:44 AM    ALKPHOS 38 10/07/2024 05:44 AM    AST 16 10/07/2024 05:44 AM    ALT 6 10/07/2024 05:44 AM        XR CHEST PORTABLE 
     Subjective:    Up in chair, still feels very weak    Remains unable to ambulate without dizziness    Excellent diuresis    Objective:    /78   Pulse 91   Temp 98.7 °F (37.1 °C) (Oral)   Resp 18   Ht 1.753 m (5' 9\")   Wt 84.1 kg (185 lb 6.4 oz)   SpO2 96%   BMI 27.38 kg/m²   Gen: Alert and in no apparent distress   Skin: Warm and dry  Neck: Supple, no JVD  Heart:  RRR, no murmurs, gallops, or rubs.  Lungs:  CTA bilaterally, no wheeze, rales or rhonchi  Abd: bowel sounds present, nontender, nondistended, no masses  Extrem:  No clubbing, cyanosis, or edema, pulses intact    I/O last 3 completed shifts:  In: -   Out: 3750 [Urine:3750]    Laboratory:     CBC with Differential:    Lab Results   Component Value Date/Time    WBC 13.1 10/13/2024 01:34 AM    RBC 3.11 10/13/2024 01:34 AM    HGB 9.7 10/13/2024 01:34 AM    HCT 30.7 10/13/2024 01:34 AM     10/13/2024 01:34 AM    MCV 98.7 10/13/2024 01:34 AM    MCH 31.2 10/13/2024 01:34 AM    MCHC 31.6 10/13/2024 01:34 AM    RDW 19.2 10/13/2024 01:34 AM    NRBC 2 11/13/2013 04:40 AM    METASPCT 1 11/11/2013 03:31 AM    LYMPHOPCT 15 10/13/2024 01:34 AM    MONOPCT 12 10/13/2024 01:34 AM    MYELOPCT 1 11/04/2013 03:00 AM    EOSPCT 3 10/13/2024 01:34 AM    BASOPCT 0 10/13/2024 01:34 AM    MONOSABS 1.58 10/13/2024 01:34 AM    LYMPHSABS 1.99 10/13/2024 01:34 AM    EOSABS 0.45 10/13/2024 01:34 AM    BASOSABS 0.05 10/13/2024 01:34 AM     CMP:    Lab Results   Component Value Date/Time     10/13/2024 01:34 AM    K 3.5 10/13/2024 01:34 AM     10/13/2024 01:34 AM    CO2 21 10/13/2024 01:34 AM    BUN 13 10/13/2024 01:34 AM    CREATININE 1.9 10/13/2024 01:34 AM    GFRAA 46 08/30/2016 11:41 AM    LABGLOM 37 10/13/2024 01:34 AM    GLUCOSE 103 10/13/2024 01:34 AM    CALCIUM 8.2 10/13/2024 01:34 AM    BILITOT 0.3 10/07/2024 05:44 AM    ALKPHOS 38 10/07/2024 05:44 AM    AST 16 10/07/2024 05:44 AM    ALT 6 10/07/2024 05:44 AM        XR CHEST PORTABLE   Final 
4 Eyes Skin Assessment     NAME:  Anuel Kwok  YOB: 1950  MEDICAL RECORD NUMBER:  [id number]    The patient is being assessed for  Admission    I agree that at least one RN has performed a thorough Head to Toe Skin Assessment on the patient. ALL assessment sites listed below have been assessed.      Areas assessed by both nurses:    Head, Face, Ears, Shoulders, Back, Chest, Arms, Elbows, Hands, Sacrum. Buttock, Coccyx, Ischium, Legs. Feet and Heels, and Under Medical Devices         Does the Patient have a Wound? No noted wound(s)       Celestine Prevention initiated by RN: Yes  Wound Care Orders initiated by RN: No    Pressure Injury (Stage 3,4, Unstageable, DTI, NWPT, and Complex wounds) if present, place Wound referral order by RN under : No    New Ostomies, if present place, Ostomy referral order under : No     Nurse 1 eSignature: Electronically signed by Reva Garcia RN on 10/3/24 at 10:38 AM EDT    **SHARE this note so that the co-signing nurse can place an eSignature**    Nurse 2 eSignature: Electronically signed by Elsy Starks RN on 10/3/24 at 12:08 PM EDT  
Abdomen soft with active bowel sounds.  
[facility]  MD Blas Odom MD Lisa Kniska, CNP   Merle Sherman, ANP  Vicky Pate, CNP  Progress Note    10/6/2024    SUBJECTIVE:   10/5: Feeling better.  Resting comfortably.  No new issue or concern.  (-) c/o's  (-) sob/henderson/cp/palp Appetite ok    10/6: Resting comfortably.  Denies complaint.  Feeling better than yesterday.  ROS unremarkable.    PROBLEM LIST:    Principal Problem:    GI bleed  Active Problems:    Chronic diarrhea    Anemia    GERD (gastroesophageal reflux disease)    Depression    Protein calorie malnutrition (HCC)    Hypotension    Anxiety state    Hypothyroid    Hyperlipemia    Status post limb salvage procedures    Acute kidney injury (HCC)    Hematochezia    MDD (major depressive disorder)    Acute hyperglycemia  Resolved Problems:    * No resolved hospital problems. *         DIET:    ADULT DIET; Clear Liquid     MEDS (scheduled):    pantoprazole  40 mg IntraVENous BID    ferric gluconate (FERRLECIT) 125 mg in sodium chloride 0.9 % 100 mL IVPB  125 mg IntraVENous Daily    levofloxacin  750 mg IntraVENous Q48H    metroNIDAZOLE  500 mg IntraVENous Q8H    hydrocortisone   Rectal BID    ARIPiprazole  2 mg Oral QAM    [Held by provider] vitamin C  1,000 mg Oral Dinner    cetirizine  10 mg Oral Daily    DULoxetine  60 mg Oral BID WC    finasteride  5 mg Oral Nightly    levothyroxine  75 mcg Oral QAM AC    montelukast  10 mg Oral QAM    [Held by provider] tamsulosin  0.4 mg Oral Nightly    sodium chloride flush  5-40 mL IntraVENous 2 times per day    insulin lispro  0-4 Units SubCUTAneous TID WC    insulin lispro  0-4 Units SubCUTAneous Nightly    midodrine  5 mg Oral TID WC       MEDS (infusions):   sodium chloride      sodium chloride 150 mL/hr at 10/06/24 1447    sodium chloride      sodium chloride      sodium chloride      sodium chloride      dextrose         MEDS (prn):  sodium chloride, melatonin, sodium chloride, sodium chloride, clonazePAM, 
[facility]  MD Blas Odom MD Lisa Kniska, CNP   Merle Sherman, ANP  Vicky Pate, DAVE  Progress Note    10/11/2024    SUBJECTIVE:   10/5: Feeling better.  Resting comfortably.  No new issue or concern.  (-) c/o's  (-) sob/henderson/cp/palp Appetite ok    10/6: Resting comfortably.  Denies complaint.  Feeling better than yesterday.  ROS unremarkable.    10/7: No new complaint or issue.  NPO.  BP stable.    10/8: Hematochezia with the bowel prep last night.  Resting comfortably sitting in the chair today, awaiting colonoscopy this afternoon.  BP stable.  BFR as prescribed.  IV fluid running.    10/9: Ongoing loose stools.  No hematochezia or melena.  Colonoscopy evaluation noted.  BP stable.    10/10:  Feels a bit better today.  Sitting in the chair.  Spouse at bedside. Appetite is better.  Denies chest pain,palpitations or SOB.  Denies nausea, vomiting, hematochezia or melena.      10/11: Feels \"weak, and a little bit lightheaded.\"  Urine output picked up after diuretic therapy yesterday, though still feels quite swollen.  Denies dyspnea at rest on room air.  No cough wheeze or sputum production.  No nausea or vomiting.  No headache.  No chest pain or palpitations.    PROBLEM LIST:    Principal Problem:    GI bleed  Active Problems:    Chronic diarrhea    Anemia    GERD (gastroesophageal reflux disease)    Depression    Protein calorie malnutrition (HCC)    Hypotension    Anxiety state    Hypothyroid    Hyperlipemia    Status post limb salvage procedures    Acute kidney injury (HCC)    Hematochezia    MDD (major depressive disorder)    Acute hyperglycemia    Anasarca  Resolved Problems:    * No resolved hospital problems. *         DIET:    ADULT DIET; Regular; 5 carb choices (75 gm/meal)     MEDS (scheduled):    midodrine  10 mg Oral TID WC    [START ON 10/12/2024] levoFLOXacin  750 mg Oral Q48H    metroNIDAZOLE  500 mg Oral q8h    potassium chloride  10 mEq IntraVENous Q1H 
[facility]  MD Blas Odom, MD Argelia Oropeza, CNP   Merle Sherman, ANP  Vicky Pate, CNP  Progress Note    10/15/2024    SUBJECTIVE:   10/5: Feeling better.  Resting comfortably.  No new issue or concern.  (-) c/o's  (-) sob/henderson/cp/palp Appetite ok    10/6: Resting comfortably.  Denies complaint.  Feeling better than yesterday.  ROS unremarkable.    10/7: No new complaint or issue.  NPO.  BP stable.    10/8: Hematochezia with the bowel prep last night.  Resting comfortably sitting in the chair today, awaiting colonoscopy this afternoon.  BP stable.  BFR as prescribed.  IV fluid running.    10/9: Ongoing loose stools.  No hematochezia or melena.  Colonoscopy evaluation noted.  BP stable.    10/10:  Feels a bit better today.  Sitting in the chair.  Spouse at bedside. Appetite is better.  Denies chest pain,palpitations or SOB.  Denies nausea, vomiting, hematochezia or melena.      10/11: Feels \"weak, and a little bit lightheaded.\"  Urine output picked up after diuretic therapy yesterday, though still feels quite swollen.  Denies dyspnea at rest on room air.  No cough wheeze or sputum production.  No nausea or vomiting.  No headache.  No chest pain or palpitations.    10/12 sitting in chair , continue to have LE edema . Having some loose BM on RA weak .     10/13 laying in bed on RA comfortable     10/14: Sitting up in bed, no acute distress. Still having a decent amount of loose bowel movements. He is still orthostatic (118/71--87/65). He does have dizziness upon getting up. He feels his swelling is improving. Denies any shortness of breath. PO intake is fair.     10/15: Sitting up in the chair. Feels much better today. Still having diarrhea, though reports this is not out of the ordinary for him. No dizziness today. Orthostatic blood pressures have improved.     PROBLEM LIST:    Principal Problem:    GI bleed  Active Problems:    Chronic diarrhea    Anemia    GERD 
[facility]  MD Blas Odom, MD Argelia Oropeza, CNP   Merle Sherman, ANP  Vicky Pate, CNP  Progress Note    10/5/2024    SUBJECTIVE:   10/5: Feeling better.  Resting comfortably.  No new issue or concern.  (-) c/o's  (-) sob/henderson/cp/palp Appetite ok    PROBLEM LIST:    Principal Problem:    GI bleed  Active Problems:    Chronic diarrhea    Anemia    GERD (gastroesophageal reflux disease)    Depression    Protein calorie malnutrition (HCC)    Hypotension    Anxiety state    Hypothyroid    Hyperlipemia    Status post limb salvage procedures    Acute kidney injury (HCC)    Hematochezia    MDD (major depressive disorder)    Acute hyperglycemia  Resolved Problems:    * No resolved hospital problems. *         DIET:    ADULT DIET; Clear Liquid     MEDS (scheduled):    magnesium sulfate  3,000 mg IntraVENous Once    pantoprazole  40 mg IntraVENous BID    ferric gluconate (FERRLECIT) 125 mg in sodium chloride 0.9 % 100 mL IVPB  125 mg IntraVENous Daily    levofloxacin  750 mg IntraVENous Q48H    metroNIDAZOLE  500 mg IntraVENous Q8H    hydrocortisone   Rectal BID    ARIPiprazole  2 mg Oral QAM    [Held by provider] vitamin C  1,000 mg Oral Dinner    cetirizine  10 mg Oral Daily    DULoxetine  60 mg Oral BID WC    finasteride  5 mg Oral Nightly    levothyroxine  75 mcg Oral QAM AC    montelukast  10 mg Oral QAM    [Held by provider] tamsulosin  0.4 mg Oral Nightly    sodium chloride flush  5-40 mL IntraVENous 2 times per day    insulin lispro  0-4 Units SubCUTAneous TID WC    insulin lispro  0-4 Units SubCUTAneous Nightly    midodrine  5 mg Oral TID WC       MEDS (infusions):   sodium bicarbonate 150 mEq in dextrose 5 % 1,000 mL infusion 150 mL/hr at 10/05/24 1029    sodium chloride      sodium chloride      sodium chloride      sodium chloride      dextrose         MEDS (prn):  melatonin, sodium chloride, sodium chloride, clonazePAM, sodium chloride flush, sodium chloride, 
[facility]  MD Blas Odom, MD Argelia Oropeza, CNP   Merle Sherman, ANP  Vicky Pate, CNP  Progress Note    10/8/2024    SUBJECTIVE:   10/5: Feeling better.  Resting comfortably.  No new issue or concern.  (-) c/o's  (-) sob/henderson/cp/palp Appetite ok    10/6: Resting comfortably.  Denies complaint.  Feeling better than yesterday.  ROS unremarkable.    10/7: No new complaint or issue.  NPO.  BP stable.    10/8: Hematochezia with the bowel prep last night.  Resting comfortably sitting in the chair today, awaiting colonoscopy this afternoon.  BP stable.  BFR as prescribed.  IV fluid running.    PROBLEM LIST:    Principal Problem:    GI bleed  Active Problems:    Chronic diarrhea    Anemia    GERD (gastroesophageal reflux disease)    Depression    Protein calorie malnutrition (HCC)    Hypotension    Anxiety state    Hypothyroid    Hyperlipemia    Status post limb salvage procedures    Acute kidney injury (HCC)    Hematochezia    MDD (major depressive disorder)    Acute hyperglycemia  Resolved Problems:    * No resolved hospital problems. *         DIET:    Diet NPO Exceptions are: Sips of Water with Meds     MEDS (scheduled):    pantoprazole  40 mg IntraVENous BID    levofloxacin  750 mg IntraVENous Q48H    metroNIDAZOLE  500 mg IntraVENous Q8H    hydrocortisone   Rectal BID    ARIPiprazole  2 mg Oral QAM    [Held by provider] vitamin C  1,000 mg Oral Dinner    cetirizine  10 mg Oral Daily    DULoxetine  60 mg Oral BID WC    finasteride  5 mg Oral Nightly    levothyroxine  75 mcg Oral QAM AC    montelukast  10 mg Oral QAM    [Held by provider] tamsulosin  0.4 mg Oral Nightly    sodium chloride flush  5-40 mL IntraVENous 2 times per day    insulin lispro  0-4 Units SubCUTAneous TID WC    insulin lispro  0-4 Units SubCUTAneous Nightly    midodrine  5 mg Oral TID WC       MEDS (infusions):   lactated ringers IV soln 110 mL/hr at 10/08/24 0553    sodium chloride      sodium chloride  
[facility]  MD Blas Odom, MD Argelia Oropeza, CNP   Merle Sherman, ANP  Vicky Pate, DAVE  Progress Note    10/13/2024    SUBJECTIVE:   10/5: Feeling better.  Resting comfortably.  No new issue or concern.  (-) c/o's  (-) sob/henderson/cp/palp Appetite ok    10/6: Resting comfortably.  Denies complaint.  Feeling better than yesterday.  ROS unremarkable.    10/7: No new complaint or issue.  NPO.  BP stable.    10/8: Hematochezia with the bowel prep last night.  Resting comfortably sitting in the chair today, awaiting colonoscopy this afternoon.  BP stable.  BFR as prescribed.  IV fluid running.    10/9: Ongoing loose stools.  No hematochezia or melena.  Colonoscopy evaluation noted.  BP stable.    10/10:  Feels a bit better today.  Sitting in the chair.  Spouse at bedside. Appetite is better.  Denies chest pain,palpitations or SOB.  Denies nausea, vomiting, hematochezia or melena.      10/11: Feels \"weak, and a little bit lightheaded.\"  Urine output picked up after diuretic therapy yesterday, though still feels quite swollen.  Denies dyspnea at rest on room air.  No cough wheeze or sputum production.  No nausea or vomiting.  No headache.  No chest pain or palpitations.    10/12 sitting in chair , continue to have LE edema . Having some loose BM on RA weak .     10/13 laying in bed on RA comfortable     PROBLEM LIST:    Principal Problem:    GI bleed  Active Problems:    Chronic diarrhea    Anemia    GERD (gastroesophageal reflux disease)    Depression    Protein calorie malnutrition (HCC)    Hypotension    Anxiety state    Hypothyroid    Hyperlipemia    Status post limb salvage procedures    Acute kidney injury (HCC)    Hematochezia    MDD (major depressive disorder)    Acute hyperglycemia    Anasarca  Resolved Problems:    * No resolved hospital problems. *         DIET:    ADULT DIET; Regular; 5 carb choices (75 gm/meal)     MEDS (scheduled):    magnesium oxide  400 mg Oral 
BMP results sent to Dr. Yeh.   
Blood product consent signed with patient, placed in soft chart  
CLINICAL PHARMACY NOTE: MEDS TO BEDS    Total # of Prescriptions Filled: 7   The following medications were delivered to the patient:  Lamotil 2.5/0.025  Levaquin 750 mg   Flagyl 500 mg   Midodrine 10 mg   Proctozone 2.5%  Sodium bicarb 650 mg   Clonazepam 0.5 mg       Additional Documentation:    
Chichi NP- notified of hgb 6.9. Order received for 1 unit  
Comprehensive Nutrition Assessment    Type and Reason for Visit:  Reassess    Nutrition Recommendations/Plan:   Continue current diet, as tolerated     Malnutrition Assessment:  Malnutrition Status:  At risk for malnutrition (Comment) (10/08/24 1238)    Context:  Acute Illness     Findings of the 6 clinical characteristics of malnutrition:  Energy Intake:  50% or less of estimated energy requirements for 5 or more days  Weight Loss:  Unable to assess (d/t lack of actual wt hx per EMR)     Body Fat Loss:  Unable to assess (pt in endo)     Muscle Mass Loss:  Unable to assess    Fluid Accumulation:  Unable to assess (multifactorial)     Strength:  Not Performed    Nutrition Assessment:    S/p 10/8 colonoscopy=Normal colonoscopy with minimal diverticulosis and prior ileocecectomy. Diarrhea is resolving. Advanced to 5 carb/meal CHO controlled diet and tolerating. Recommend continue same, as tolerated.    Nutrition Related Findings:    soft round abd +BS, A&Ox4, +I/O 7.6L, +2 LE edema, hypokalemia, Wound Type: None (red heels, rash)       Current Nutrition Intake & Therapies:    Average Meal Intake: 51-75% (x 1 per flowsheets)  Average Supplements Intake: None Ordered  ADULT DIET; Regular; 5 carb choices (75 gm/meal)    Anthropometric Measures:  Height: 175.3 cm (5' 9\")  Ideal Body Weight (IBW): 160 lbs (73 kg)    Admission Body Weight: 78.1 kg (172 lb 3.2 oz) (10/4 bed)  Current Body Weight: 88 kg (194 lb 0.1 oz), 115.6 % IBW. Weight Source: Bed Scale (10/11)  Current BMI (kg/m2): 28.6  Usual Body Weight:  (no actual EMR wt hx <1 year, 3/1/24 160# OV no method)                       BMI Categories: Overweight (BMI 25.0-29.9)    Estimated Daily Nutrient Needs:  Energy Requirements Based On: Formula  Weight Used for Energy Requirements: Admission  Energy (kcal/day):   Weight Used for Protein Requirements: Ideal  Protein (g/day): 80-90  Method Used for Fluid Requirements: 1 ml/kcal  Fluid (ml/day): 
Covering provider NP Chichi islas served with potassium and calcium results. Awaiting response  
Dr Duran notified of positive orthos.   
Dr Yeh notified of K 3.1 with no PRN scale in place.   
Dr. Granda messaged via perfect serve for potassium 3.2 and magnesium 1.5.   
Dr. Granda notified that patient just returned from blood loss scan looking pale, BP 42/doppler, HR 120s with symptoms of dizziness/lightheadedness, SOB, weakness/fatigue. New orders received   
Dr. Yeh notified of BMP results  
Internal Medicine Progress Note    Patient's name: Anuel Kwok  : 1950  Chief complaints (on day of admission): Rectal Bleeding (Bright red blood rectally since 2am, hx of bowel rescation )  Admission date: 10/3/2024  Date of service: 10/10/2024   Room: [address] INTERMEDIATE  Primary care physician: Valentín Christianson MD  Reason for visit: Follow-up for GI bleeding     Subjective  Anuel is seen lying in bed asleep in no distress.  He does easily awaken to voice.  He reports that overall he does seem to be feeling a little bit better.  He reports that he is very swollen, more so on the left arm.  He does report having some pain around his left ear.  He denies any shortness of breath or difficulty breathing at this time.  He reports that he did feel like he worked with therapy better yesterday, was up in the chair the majority of the day.  He denies any nausea or vomiting.  Reports that he did eat fairly well yesterday.  No other issues or concerns from nursing.    Review of Systems  Full 10 point review of systems negative unless mentioned above.    Hospital Medications  Current Facility-Administered Medications   Medication Dose Route Frequency Provider Last Rate Last Admin    magnesium sulfate 4000 mg in 100 mL IVPB premix  4,000 mg IntraVENous Once Vicky Thompson APRN - CNP        potassium chloride 10 mEq/100 mL IVPB (Peripheral Line)  10 mEq IntraVENous Q1H Vicky Thompson APRN - CNP        rivaroxaban (XARELTO) tablet 20 mg  20 mg Oral Dinner Vicky Thompson APRN - CNP   20 mg at 10/09/24 1609    Loperamide HCl (IMODIUM) 1 MG/7.5ML solution 4 mg  4 mg Oral 4x Daily PRN Sowmya Snow APRN - CNP   4 mg at 10/09/24 2004    diphenoxylate-atropine (LOMOTIL) 2.5-0.025 MG per tablet 1 tablet  1 tablet Oral TID Sowmya Snow APRN - CNP   1 tablet at 10/10/24 0751    lipase-protease-amylase (ZENPEP) 08213-66076 units delayed release capsule 20,000 Units  20,000 Units Oral TID  Edy 
Internal Medicine Progress Note    Patient's name: Anuel Kwok  : 1950  Chief complaints (on day of admission): Rectal Bleeding (Bright red blood rectally since 2am, hx of bowel rescation )  Admission date: 10/3/2024  Date of service: 10/11/2024   Room: [address] INTERMEDIATE  Primary care physician: Valentín Christianson MD  Reason for visit: Follow-up for GI bleeding     Subjective  Anuel is seen sitting up in bed awake and alert in no distress. He reports still feeling tired. He continues to have dizziness when getting up. Denies any nausea or vomiting. Denies any fever or chills. Orthostatic vitals remain positive -- echocardiogram ordered per Cardiology. No other issues or concerns from nursing.    Review of Systems  Full 10 point review of systems negative unless mentioned above.    Hospital Medications  Current Facility-Administered Medications   Medication Dose Route Frequency Provider Last Rate Last Admin    midodrine (PROAMATINE) tablet 10 mg  10 mg Oral TID  Fabian Azar MD        rivaroxaban (XARELTO) tablet 20 mg  20 mg Oral Dinner Vicky Thompson APRN - CNP   20 mg at 10/10/24 155    Loperamide HCl (IMODIUM) 1 MG/7.5ML solution 4 mg  4 mg Oral 4x Daily PRN Sowmya Snow APRN - CNP   4 mg at 10/09/24 2004    diphenoxylate-atropine (LOMOTIL) 2.5-0.025 MG per tablet 1 tablet  1 tablet Oral TID Sowmya Snow APRN - CNP   1 tablet at 10/10/24 2044    lipase-protease-amylase (ZENPEP) 20858-93801 units delayed release capsule 20,000 Units  20,000 Units Oral TID  Sowmya Snow APRN - CNP   20,000 Units at 10/10/24 155    white petrolatum ointment   Topical PRN Ish Duran, DO   Given at 10/11/24 0359    0.9 % sodium chloride infusion   IntraVENous PRN Chichi Parker APRN - CNP        melatonin tablet 3 mg  3 mg Oral Nightly PRN Ish Duran, DO   3 mg at 10/09/24 2004    pantoprazole (PROTONIX) injection 40 mg  40 mg IntraVENous BID Ish Duran, DO   40 
Internal Medicine Progress Note    Patient's name: Anuel Kwok  : 1950  Chief complaints (on day of admission): Rectal Bleeding (Bright red blood rectally since 2am, hx of bowel rescation )  Admission date: 10/3/2024  Date of service: 10/14/2024   Room: [place] [address]  Primary care physician: Valentín Christianson MD  Reason for visit: Follow-up for GI bleeding    Subjective  Anuel is seen lying in bed asleep in no distress. He does easily awaken to voice. He reports still feeling very weak and tired. He reports ongoing dizziness when getting up, feels like it is worse. He denies any nausea or vomiting. Denies any fever or chills. He reports that he is still having diarrhea. Extensive conversation with him regarding his overall condition and weakness. Discussed the need for rehab but he continues to adamantly refuse rehab -- therapy to re-evaluate today. No other issues or concerns from nursing.    Review of Systems  Full 10 point review of systems negative unless mentioned above.    Hospital Medications  Current Facility-Administered Medications   Medication Dose Route Frequency Provider Last Rate Last Admin    potassium chloride (KLOR-CON M) extended release tablet 40 mEq  40 mEq Oral PRN Delio Granda MD   40 mEq at 10/14/24 0813    Or    potassium bicarb-citric acid (EFFER-K) effervescent tablet 40 mEq  40 mEq Oral PRN Delio Granda MD        Or    potassium chloride 10 mEq/100 mL IVPB (Peripheral Line)  10 mEq IntraVENous PRN Delio Granda MD        magnesium sulfate 2000 mg in 50 mL IVPB premix  2,000 mg IntraVENous PRN Delio Granda MD 25 mL/hr at 10/14/24 0822 2,000 mg at 10/14/24 0822    magnesium oxide (MAG-OX) tablet 400 mg  400 mg Oral Daily Chriss Burton DO   400 mg at 10/14/24 0806    midodrine (PROAMATINE) tablet 10 mg  10 mg Oral TID Fabian Prescott MD   10 mg at 10/14/24 0805    levoFLOXacin (LEVAQUIN) tablet 750 mg  750 mg Oral Q48H Ish Duran DO   750 mg at 
Internal Medicine Progress Note    Patient's name: Anuel Kwok  : 1950  Chief complaints (on day of admission): Rectal Bleeding (Bright red blood rectally since 2am, hx of bowel rescation )  Admission date: 10/3/2024  Date of service: 10/4/2024   Room: [address] INTERMEDIATE  Primary care physician: Valentín Christianson MD  Reason for visit: Follow-up for GI bleeding     Subjective  Anuel is seen lying in bed awake and alert in no distress. He reports overall feeling fatigued and weak. He does have some shortness of breath if he tries to exert himself. He reports some mild discomfort in his lower abdomen, feels like someone is punching him. He denies any nausea or vomiting. Reports being dizzy and lightheaded if he tries to sit up. In discussion with nursing he moved his bowels last night and it was dark red with clots filling the bedpan. Bleeding scan was completed yesterday and negative. He has received 3 units PRBC since admission and hemoglobin continues to trend down -- repeat H&H to be completed this AM. Did explain to patient to remain NPO for now pending GI evaluation this AM as may need endoscopy. No other issues or concerns from nursing.    Review of Systems  Full 10 point review of systems negative unless mentioned above.    Hospital Medications  Current Facility-Administered Medications   Medication Dose Route Frequency Provider Last Rate Last Admin    melatonin tablet 3 mg  3 mg Oral Nightly PRN Ish Duran DO        pantoprazole (PROTONIX) injection 40 mg  40 mg IntraVENous BID Ish Duran DO        0.9 % sodium chloride infusion  50 mL IntraVENous Once Delio Granda MD        0.9 % sodium chloride infusion   IntraVENous PRN Delio Granda MD        hydrocortisone (ANUSOL-HC) 2.5 % rectal cream   Rectal BID Delio Granda MD   Given at 10/03/24 2138    ARIPiprazole (ABILIFY) tablet 2 mg  2 mg Oral QAM Delio Granda MD        [Held by provider] ascorbic acid (VITAMIN C) tablet 
Internal Medicine Progress Note    Patient's name: Anuel Kwok  : 1950  Chief complaints (on day of admission): Rectal Bleeding (Bright red blood rectally since 2am, hx of bowel rescation )  Admission date: 10/3/2024  Date of service: 10/7/2024   Room: [address] INTERMEDIATE  Primary care physician: Valentín Christianson MD  Reason for visit: Follow-up for GI bleeding     Subjective  Anuel is seen lying in bed awake and alert in no distress. He reports feeling weak and fatigued. He reports that he continues to have some nausea but denies any emesis. Still with some generalized abdominal discomfort and cramping. He reports that anytime he tries to drink anything he immediately has diarrhea. Denies any further bleeding but his stools have been mucousy. He reports that he really hasn't been getting out of bed due to his weakness. He feels like his breathing is heavy at times. He is asking if he will be having a colonoscopy soon and what the plan is. No other issues or concerns from nursing.    Review of Systems  Full 10 point review of systems negative unless mentioned above.    Hospital Medications  Current Facility-Administered Medications   Medication Dose Route Frequency Provider Last Rate Last Admin    white petrolatum ointment   Topical PRN Ish Duran DO        0.9 % sodium chloride infusion   IntraVENous PRN Chichi Parker APRN - CNP        dextrose 5 % and 0.45 % sodium chloride infusion   IntraVENous Continuous Aram Yeh MD 80 mL/hr at 10/07/24 0603 New Bag at 10/07/24 0603    melatonin tablet 3 mg  3 mg Oral Nightly PRN Ish Duran DO        pantoprazole (PROTONIX) injection 40 mg  40 mg IntraVENous BID Ish Duran DO   40 mg at 10/06/24 2207    ferric gluconate (FERRLECIT) 125 mg in sodium chloride 0.9 % 100 mL IVPB  125 mg IntraVENous Daily Vicky Pate APRN - CNP   Stopped at 10/06/24 1142    levoFLOXacin (LEVAQUIN) 750 MG/150ML infusion 750 mg  750 mg IntraVENous 
Message sent to Dr Barkley via [employer] regarding nuclear med not doing GI blood loss scans two days in a row.   
Message sent to Dr Granda via [employer] regarding pts massive bloody liquid bm  
Message sent to Dr Granda via [employer] regarding diet orders   
Message sent to Dr Granda via [employer] regarding pts home medications being uploaded & correct   
Message sent to Dr. Barkley in regards to patient requesting diet order post colonoscopy.   
Message sent to Dr. Yeh with BMP results.   
New consult placed to cardio.   
[facility]  
Notified Dr Burton of Mag 1.4.   
Notified Dr Duran of the CT of abdomen results  
Notified Dr Tiwari regarding pts BMP panel   
OCCUPATIONAL THERAPY INITIAL EVALUATION    [facility]   [address] [facility]        Date:10/4/2024                                                  Patient Name: Anuel Kwok    MRN: [id number]    : 1950    Room: [address] [place] [address]    Evaluating OT: Carmella Godinez OTR/L #[id number]    Referring Provider:  Delio Granda MD     Specific Provider Orders/Date:  OT Eval and Treat , 10/3/2024     Diagnosis:   1. Lower GI bleed         Surgery: None       Pertinent Medical History: Anxiety, cervical vertebra fx, thyroid disease, T2DM, UI    Precautions:  Fall Risk, alarm, contact isolation      Assessment of current deficits    [x] Functional mobility  [x]ADLs  [x] Strength               []Cognition    [x] Functional transfers   [x] IADLs         [x] Safety Awareness   [x]Endurance    [] Fine Coordination              [x] Balance      [] Vision/perception   []Sensation     []Gross Motor Coordination  [] ROM  [] Delirium                   [] Motor Control     OT PLAN OF CARE   OT POC based on physician orders, patient diagnosis and results of clinical assessment    Frequency/Duration 2-5 days/wk for 2-4 weeks PRN     Specific OT Treatment Interventions to include:   * Instruction/training on adapted ADL techniques and AE recommendations to increase functional independence within precautions       * Training on energy conservation strategies, correct breathing pattern and techniques to improve independence/tolerance for self-care routine  * Functional transfer/mobility training/DME recommendations for increased independence, safety, and fall prevention  * Patient/Family education to increase follow through with safety techniques and functional independence  * Recommendation of environmental modifications for increased safety with functional transfers/mobility and ADLs  * Therapeutic exercise to improve motor endurance, ROM, and functional strength for 
Occupational Therapy  OT BEDSIDE TREATMENT NOTE      Date:10/10/2024  Patient Name: Anuel Kwok  MRN: [id number]  : 1950  Room: [address] [place], [place] [address]       Evaluating OT: Carmella Godinez OTR/L #[id number]     Referring Provider:  Delio Granda MD      Specific Provider Orders/Date:  OT Eval and Treat , 10/3/2024      Diagnosis:   1. Lower GI bleed         Surgery: None        Pertinent Medical History: Anxiety, cervical vertebra fx, thyroid disease, T2DM, UI     Precautions:  Fall Risk      Assessment of current deficits    [x] Functional mobility            [x]ADLs           [x] Strength                   []Cognition    [x] Functional transfers          [x] IADLs          [x] Safety Awareness   [x]Endurance    [] Fine Coordination              [x] Balance      [] Vision/perception    []Sensation      []Gross Motor Coordination  [] ROM           [] Delirium                   [] Motor Control      OT PLAN OF CARE   OT POC based on physician orders, patient diagnosis and results of clinical assessment     Frequency/Duration 2-5 days/wk for 2-4 weeks PRN      Specific OT Treatment Interventions to include:   * Instruction/training on adapted ADL techniques and AE recommendations to increase functional independence within precautions       * Training on energy conservation strategies, correct breathing pattern and techniques to improve independence/tolerance for self-care routine  * Functional transfer/mobility training/DME recommendations for increased independence, safety, and fall prevention  * Patient/Family education to increase follow through with safety techniques and functional independence  * Recommendation of environmental modifications for increased safety with functional transfers/mobility and ADLs  * Therapeutic exercise to improve motor endurance, ROM, and functional strength for ADLs/functional transfers  * Therapeutic activities to facilitate/challenge dynamic balance, stand tolerance for increased safety 
Occupational Therapy  OT BEDSIDE TREATMENT NOTE      Date:10/15/2024  Patient Name: Anuel Kwok  MRN: [id number]  : 1950  Room: [address] [place], [place] [address]     Evaluating OT: Carmella Godinez OTR/L #[id number]     Referring Provider:  Delio Granda MD      Specific Provider Orders/Date:  OT Eval and Treat , 10/3/2024      Diagnosis:   1. Lower GI bleed         Surgery: None        Pertinent Medical History: Anxiety, cervical vertebra fx, thyroid disease, T2DM, UI     Precautions:  Fall Risk      Assessment of current deficits    [x] Functional mobility            [x]ADLs           [x] Strength                   []Cognition    [x] Functional transfers          [x] IADLs          [x] Safety Awareness   [x]Endurance    [] Fine Coordination              [x] Balance      [] Vision/perception    []Sensation      []Gross Motor Coordination  [] ROM           [] Delirium                   [] Motor Control      OT PLAN OF CARE   OT POC based on physician orders, patient diagnosis and results of clinical assessment     Frequency/Duration 2-5 days/wk for 2-4 weeks PRN      Specific OT Treatment Interventions to include:   * Instruction/training on adapted ADL techniques and AE recommendations to increase functional independence within precautions       * Training on energy conservation strategies, correct breathing pattern and techniques to improve independence/tolerance for self-care routine  * Functional transfer/mobility training/DME recommendations for increased independence, safety, and fall prevention  * Patient/Family education to increase follow through with safety techniques and functional independence  * Recommendation of environmental modifications for increased safety with functional transfers/mobility and ADLs  * Therapeutic exercise to improve motor endurance, ROM, and functional strength for ADLs/functional transfers  * Therapeutic activities to facilitate/challenge dynamic balance, stand tolerance for increased safety and 
Occupational Therapy  OT BEDSIDE TREATMENT NOTE      Date:10/7/2024  Patient Name: Anuel Kwok  MRN: [id number]  : 1950  Room: [address] [place] [address]A     Pt laying in the bed.  Wife present.  Pt reports he is completing bowel prep for colonoscopy tomorrow.  Pleasantly declined therapy at this time.  Wife is requesting therapy assess pt function after colonoscopy.  She verbalized concern with mobility due to dizziness and limited function since he has been in the hospital.  Will attempt therapy another time.         Time In: 235  Time Out: 240     Min Units   Therapeutic Ex 98295     Therapeutic Activities 27640     ADL/Self Care 65043     Orthotic Management 87256     Neuro Re-Ed 37865     Non-Billable Time 5    TOTAL TIMED TREATMENT           Marcio PIERRE/L [id number]    
Occupational Therapy  OT BEDSIDE TREATMENT NOTE      Date:10/8/2024  Patient Name: Anuel Kwok  MRN: [id number]  : 1950  Room: [address] [place], [place] [address]       Evaluating OT: Carmella Godinez OTR/L #[id number]     Referring Provider:  Delio Granda MD      Specific Provider Orders/Date:  OT Eval and Treat , 10/3/2024      Diagnosis:   1. Lower GI bleed         Surgery: None        Pertinent Medical History: Anxiety, cervical vertebra fx, thyroid disease, T2DM, UI     Precautions:  Fall Risk      Assessment of current deficits    [x] Functional mobility            [x]ADLs           [x] Strength                   []Cognition    [x] Functional transfers          [x] IADLs          [x] Safety Awareness   [x]Endurance    [] Fine Coordination              [x] Balance      [] Vision/perception    []Sensation      []Gross Motor Coordination  [] ROM           [] Delirium                   [] Motor Control      OT PLAN OF CARE   OT POC based on physician orders, patient diagnosis and results of clinical assessment     Frequency/Duration 2-5 days/wk for 2-4 weeks PRN      Specific OT Treatment Interventions to include:   * Instruction/training on adapted ADL techniques and AE recommendations to increase functional independence within precautions       * Training on energy conservation strategies, correct breathing pattern and techniques to improve independence/tolerance for self-care routine  * Functional transfer/mobility training/DME recommendations for increased independence, safety, and fall prevention  * Patient/Family education to increase follow through with safety techniques and functional independence  * Recommendation of environmental modifications for increased safety with functional transfers/mobility and ADLs  * Therapeutic exercise to improve motor endurance, ROM, and functional strength for ADLs/functional transfers  * Therapeutic activities to facilitate/challenge dynamic balance, stand tolerance for increased safety and 
Occupational Therapy  OT BEDSIDE TREATMENT NOTE      Date:10/9/2024  Patient Name: Anuel Kwok  MRN: [id number]  : 1950  Room: [address] [place], [place] [address]     Evaluating OT: Carmella Godinez OTR/L #[id number]     Referring Provider:  Delio Granda MD      Specific Provider Orders/Date:  OT Eval and Treat , 10/3/2024      Diagnosis:   1. Lower GI bleed         Surgery: None        Pertinent Medical History: Anxiety, cervical vertebra fx, thyroid disease, T2DM, UI     Precautions:  Fall Risk      Assessment of current deficits    [x] Functional mobility            [x]ADLs           [x] Strength                   []Cognition    [x] Functional transfers          [x] IADLs          [x] Safety Awareness   [x]Endurance    [] Fine Coordination              [x] Balance      [] Vision/perception    []Sensation      []Gross Motor Coordination  [] ROM           [] Delirium                   [] Motor Control      OT PLAN OF CARE   OT POC based on physician orders, patient diagnosis and results of clinical assessment     Frequency/Duration 2-5 days/wk for 2-4 weeks PRN      Specific OT Treatment Interventions to include:   * Instruction/training on adapted ADL techniques and AE recommendations to increase functional independence within precautions       * Training on energy conservation strategies, correct breathing pattern and techniques to improve independence/tolerance for self-care routine  * Functional transfer/mobility training/DME recommendations for increased independence, safety, and fall prevention  * Patient/Family education to increase follow through with safety techniques and functional independence  * Recommendation of environmental modifications for increased safety with functional transfers/mobility and ADLs  * Therapeutic exercise to improve motor endurance, ROM, and functional strength for ADLs/functional transfers  * Therapeutic activities to facilitate/challenge dynamic balance, stand tolerance for increased safety and 
PROGRESS NOTE    Patient Presents with/Seen in Consultation For      Reason for Consult: GIB      CHIEF COMPLAINT:  rectal bleeding    Subjective:     Patient currently off the unit. Chart reviewed.     Review of Systems  Aside from what was mentioned in the PMH and HPI, essentially unremarkable, all others negative.    Objective:     Patient Vitals for the past 8 hrs:   BP Temp Temp src Pulse Resp SpO2   10/10/24 0745 (!) 108/59 -- -- -- -- 95 %   10/10/24 0730 126/60 97.7 °F (36.5 °C) Temporal -- 18 --   10/10/24 0142 122/62 97.6 °F (36.4 °C) -- 75 16 94 %       Deferred pt off the unit     magnesium sulfate 4000 mg in 100 mL IVPB premix, Once  potassium chloride 10 mEq/100 mL IVPB (Peripheral Line), Q1H  rivaroxaban (XARELTO) tablet 20 mg, Dinner  Loperamide HCl (IMODIUM) 1 MG/7.5ML solution 4 mg, 4x Daily PRN  diphenoxylate-atropine (LOMOTIL) 2.5-0.025 MG per tablet 1 tablet, TID  lipase-protease-amylase (ZENPEP) 50014-15484 units delayed release capsule 20,000 Units, TID WC  dextrose 5 % and 0.45 % NaCl with KCl 20 mEq infusion, Continuous  white petrolatum ointment, PRN  0.9 % sodium chloride infusion, PRN  melatonin tablet 3 mg, Nightly PRN  pantoprazole (PROTONIX) injection 40 mg, BID  levoFLOXacin (LEVAQUIN) 750 MG/150ML infusion 750 mg, Q48H  metroNIDAZOLE (FLAGYL) 500 mg in 0.9% NaCl 100 mL IVPB premix, Q8H  0.9 % sodium chloride infusion, PRN  0.9 % sodium chloride infusion, Once  0.9 % sodium chloride infusion, PRN  hydrocortisone (ANUSOL-HC) 2.5 % rectal cream, BID  ARIPiprazole (ABILIFY) tablet 2 mg, QAM  [Held by provider] ascorbic acid (VITAMIN C) tablet 1,000 mg, Dinner  clonazePAM (KLONOPIN) tablet 0.5 mg, Q12H PRN  cetirizine (ZYRTEC) tablet 10 mg, Daily  DULoxetine (CYMBALTA) extended release capsule 60 mg, BID WC  finasteride (PROSCAR) tablet 5 mg, Nightly  levothyroxine (SYNTHROID) tablet 75 mcg, QAM AC  montelukast (SINGULAIR) tablet 10 mg, QAM  [Held by provider] tamsulosin (FLOMAX) capsule 
PROGRESS NOTE    Patient Presents with/Seen in Consultation For      Reason for Consult: GIB      CHIEF COMPLAINT:  rectal bleeding    Subjective:     Patient off unit at this time having bleeding scan and CT scan completed.  Spoke with family at bedside.  Patient had large, bright red bleeding episode from his rectum, this morning.  Has been NPO.  Spoke with  requesting stat repeat bleeding scan this a.m.  Awaiting results.  Plan of care reviewed with family at bedside all questions answered    Review of Systems  Aside from what was mentioned in the PMH and HPI, essentially unremarkable, all others negative.    Objective:     Patient Vitals for the past 8 hrs:   BP Temp Temp src Resp   10/04/24 0648 126/63 98.3 °F (36.8 °C) Oral 18       Assessment deferred.  Patient off unit in nuc med    melatonin tablet 3 mg, Nightly PRN  pantoprazole (PROTONIX) injection 40 mg, BID  sodium bicarbonate 150 mEq in dextrose 5 % 1,000 mL infusion, Continuous  0.9 % sodium chloride infusion, Once  0.9 % sodium chloride infusion, PRN  hydrocortisone (ANUSOL-HC) 2.5 % rectal cream, BID  ARIPiprazole (ABILIFY) tablet 2 mg, QAM  [Held by provider] ascorbic acid (VITAMIN C) tablet 1,000 mg, Dinner  clonazePAM (KLONOPIN) tablet 0.5 mg, Q12H PRN  cetirizine (ZYRTEC) tablet 10 mg, Daily  DULoxetine (CYMBALTA) extended release capsule 60 mg, BID WC  finasteride (PROSCAR) tablet 5 mg, Nightly  levothyroxine (SYNTHROID) tablet 75 mcg, QAM AC  montelukast (SINGULAIR) tablet 10 mg, QAM  [Held by provider] tamsulosin (FLOMAX) capsule 0.4 mg, Nightly  sodium chloride flush 0.9 % injection 5-40 mL, 2 times per day  sodium chloride flush 0.9 % injection 5-40 mL, PRN  0.9 % sodium chloride infusion, PRN  ondansetron (ZOFRAN-ODT) disintegrating tablet 4 mg, Q8H PRN   Or  ondansetron (ZOFRAN) injection 4 mg, Q6H PRN  acetaminophen (TYLENOL) tablet 650 mg, Q6H PRN   Or  acetaminophen (TYLENOL) suppository 650 mg, Q6H PRN  glucose chewable tablet 16 
PROGRESS NOTE    Patient Presents with/Seen in Consultation For      Reason for Consult: GIB      CHIEF COMPLAINT:  rectal bleeding    Subjective:     Patient seen lying in bed.  NAD.  Reports 2 bloody bowel movements yesterday followed by 1 brown bowel movement.  Patient reports he did complete second bleeding scan.  No reports of nausea, vomiting, and is tolerating clear liquid diet.  Reports left lower quadrant abdominal discomfort upon palpation.  POC discussed with patient.    Review of Systems  Aside from what was mentioned in the PMH and HPI, essentially unremarkable, all others negative.    Objective:     Patient Vitals for the past 8 hrs:   BP Temp Temp src Pulse Resp SpO2 Weight   10/05/24 0119 117/68 98 °F (36.7 °C) Oral 87 16 94 % --   10/05/24 0020 -- -- -- -- -- -- 79.6 kg (175 lb 8 oz)       CONSTITUTIONAL:  awake, alert, cooperative, no apparent distress, and appears stated age  EYES:  pupils equal, round and reactive to light, sclera anicteric and conjunctiva normal  ENT:  normocephalic, oral pharynx with moist mucous membranes  LUNGS:   clear to auscultation bilaterally.  CARDIOVASCULAR:   regular rate and rhythm, no murmur noted; 2+ pulses; trace edema  ABDOMEN:  normal bowel sounds, soft, non-distended, tenderness with light palpation noted to left lower quadrant, no masses palpated  NEUROLOGIC:  Mental Status Exam:  Level of Alertness:   awake  Orientation:   person, place, time  SKIN:  pale skin color, texture, turgor    melatonin tablet 3 mg, Nightly PRN  pantoprazole (PROTONIX) injection 40 mg, BID  sodium bicarbonate 150 mEq in dextrose 5 % 1,000 mL infusion, Continuous  ferric gluconate (FERRLECIT) 125 mg in sodium chloride 0.9 % 100 mL IVPB, Daily  levoFLOXacin (LEVAQUIN) 750 MG/150ML infusion 750 mg, Q48H  metroNIDAZOLE (FLAGYL) 500 mg in 0.9% NaCl 100 mL IVPB premix, Q8H  0.9 % sodium chloride infusion, PRN  0.9 % sodium chloride infusion, Once  0.9 % sodium chloride infusion, 
Patient for colonoscopy today with Dr. Barkley for anemia work up/rectal bleeding. Additional questions and concerns addressed. Labs reviewed, K has been ordered. Consent has been signed. Stools clear. Ok to proceed.      NOHEMY Gimenez CNP 10/8/2024 11:45 AM   
Patient refusing turns with wedge pillows and repositioning at times. Educated patient on importance of turning with wedge pillows for wound prevention. Patient verbalizes understanding. Will continue to offer and encourage.   
Patient with lactic acid of 3.7 this AM. Results perfect served to Dr. Yeh   
Physical Therapy  Facility/Department: [address]  Daily Treatment Note  NAME: Anuel Kwok  : 1950  MRN: [id number]    Date of Service: 10/15/2024      Patient Diagnosis(es): The encounter diagnosis was Lower GI bleed.  Past Medical History:  has a past medical history of Anxiety, Anxiety state, unspecified, Depression, Fracture cervical vertebra-closed (HCC), Fracture of rib, GERD (gastroesophageal reflux disease), GERD (gastroesophageal reflux disease), Hyperlipidemia, Hyperthyroidism, Hypothyroidism, Irritable bowel syndrome, Nonunion of fracture, Pneumonia, Sacral fracture (HCC), Thyroid disease, Tibia/fibula fracture, Type II or unspecified type diabetes mellitus without mention of complication, not stated as uncontrolled, and Urinary incontinence.  Past Surgical History:  has a past surgical history that includes colectomy (10/22/13); thrombectomy (Left, 10/23/13); fixation device application (Left, 10/23/13); Splenectomy (10/24/2013); colectomy (10/24/2013); Splenectomy (10-); Abdominal exploration surgery; Cholecystectomy; colectomy; other surgical history (13); fracture surgery; and other surgical history (Left, 6/3/14).     Referring provider:  Ish Duran DO     PT Order:  PT eval and treat      Evaluating PT:  Dasha Cabrera PT, DPT PT [id number]     Room #:  0431/0431-A  Diagnosis:  GI bleed [K92.2]  Lower GI bleed [K92.2]  Precautions:  fall risk, low Hgb   Equipment Needs:  none.  Pt reported owing a walker and cane. ELIZABETH PETERSON     SUBJECTIVE:     Pt lives with his wife in a 1 story home with 2 stairs to enter and 0 rail.  Bed and bath is on first floor.  Pt ambulated with no device PTA.     OBJECTIVE:    Initial Evaluation  Date: 10/5 Treatment  10/15/24 Short Term/ Long Term   Goals   Was pt agreeable to Eval/treatment? yes yes      Does pt have pain? Left lower abdominal pain Buttocks sorenesss      Bed Mobility  Rolling: NT  Supine to sit: SBA  Sit to supine: 
Physical Therapy  Facility/Department: [address] INTERMEDIATE 1  Daily Treatment Note  NAME: Anuel Kwok  : 1950  MRN: [id number]    Date of Service: 10/9/2024      Patient Diagnosis(es): The encounter diagnosis was Lower GI bleed.  Past Medical History:  has a past medical history of Anxiety, Anxiety state, unspecified, Depression, Fracture cervical vertebra-closed (HCC), Fracture of rib, GERD (gastroesophageal reflux disease), GERD (gastroesophageal reflux disease), Hyperlipidemia, Hyperthyroidism, Hypothyroidism, Irritable bowel syndrome, Nonunion of fracture, Pneumonia, Sacral fracture (HCC), Thyroid disease, Tibia/fibula fracture, Type II or unspecified type diabetes mellitus without mention of complication, not stated as uncontrolled, and Urinary incontinence.  Past Surgical History:  has a past surgical history that includes colectomy (10/22/13); thrombectomy (Left, 10/23/13); fixation device application (Left, 10/23/13); Splenectomy (10/24/2013); colectomy (10/24/2013); Splenectomy (10-); Abdominal exploration surgery; Cholecystectomy; colectomy; other surgical history (13); fracture surgery; and other surgical history (Left, 6/3/14).     Referring provider:  Ish Duran DO     PT Order:  PT eval and treat      Evaluating PT:  Dasha Cabrera PT, DPT PT [id number]     Room #:  0431/0431-A  Diagnosis:  GI bleed [K92.2]  Lower GI bleed [K92.2]  Precautions:  fall risk, low Hgb   Equipment Needs:  none.  Pt reported owing a walker and cane.      SUBJECTIVE:     Pt lives with his wife in a 1 story home with 2 stairs to enter and 0 rail.  Bed and bath is on first floor.  Pt ambulated with no device PTA.     OBJECTIVE:    Initial Evaluation  Date: 10/5 Treatment  10/9/24 Short Term/ Long Term   Goals   Was pt agreeable to Eval/treatment? yes yes      Does pt have pain? Left lower abdominal pain L UE      Bed Mobility  Rolling: NT  Supine to sit: SBA  Sit to supine: SBA  Scooting: SBA to sitting 
Physical Therapy  Facility/Department: [address] INTERMEDIATE 1  Daily Treatment Note  NAME: Anuel Kwok  : 1950  MRN: [id number]    Date of Service: 10/14/2024      Patient Diagnosis(es): The encounter diagnosis was Lower GI bleed.  Past Medical History:  has a past medical history of Anxiety, Anxiety state, unspecified, Depression, Fracture cervical vertebra-closed (HCC), Fracture of rib, GERD (gastroesophageal reflux disease), GERD (gastroesophageal reflux disease), Hyperlipidemia, Hyperthyroidism, Hypothyroidism, Irritable bowel syndrome, Nonunion of fracture, Pneumonia, Sacral fracture (HCC), Thyroid disease, Tibia/fibula fracture, Type II or unspecified type diabetes mellitus without mention of complication, not stated as uncontrolled, and Urinary incontinence.  Past Surgical History:  has a past surgical history that includes colectomy (10/22/13); thrombectomy (Left, 10/23/13); fixation device application (Left, 10/23/13); Splenectomy (10/24/2013); colectomy (10/24/2013); Splenectomy (10-); Abdominal exploration surgery; Cholecystectomy; colectomy; other surgical history (13); fracture surgery; and other surgical history (Left, 6/3/14).     Referring provider:  Ish Duran DO     PT Order:  PT eval and treat      Evaluating PT:  Dasha Cabrera PT, DPT PT [id number]     Room #:  0431/0431-A  Diagnosis:  GI bleed [K92.2]  Lower GI bleed [K92.2]  Precautions:  fall risk, low Hgb   Equipment Needs:  none.  Pt reported owing a walker and cane. ELIZABETH PETERSON     SUBJECTIVE:     Pt lives with his wife in a 1 story home with 2 stairs to enter and 0 rail.  Bed and bath is on first floor.  Pt ambulated with no device PTA.     OBJECTIVE:    Initial Evaluation  Date: 10/5 Treatment  10/14/24 Short Term/ Long Term   Goals   Was pt agreeable to Eval/treatment? yes yes      Does pt have pain? Left lower abdominal pain Buttocks sorenesss      Bed Mobility  Rolling: NT  Supine to sit: SBA  Sit to supine: 
Physical Therapy  Facility/Department: [address] INTERMEDIATE 1  NAME: Anuel Kwok  : 1950  MRN: [id number]      Chart reviewed and PT treatment attempted twice this date.  First attempt, pt just gotten back into bed and second attempted, pt waiting to be taken down for testing.  Will check back at later time/date.     Dasha Cabrera, PT [id number]    
Physical Therapy  Facility/Department: [facility]  INTERMEDIATE 1  NAME: Anuel Kwok  : 1950  MRN: [id number]    Chart reviewed and PT treatment attempted this pm.  Pt reported he had just ambulated with nursing and wanted to rest.  Wife had questions about LE exercises that pt can do for strengthening.  Educated pt about ankle pump, LAQs, hip flexion and glut sets.  Pt verbalized and demonstrated understanding.   Will check back at later time/date to complete functional mobility.      Dasha Cabrera, PT [id number]    
Spiritual Health History and Assessment/Progress Note  [facility]     Encounter, Rituals, Rites and Sacraments,  ,  ,      Name: Anuel Kwok MRN: [id number]    Age: 74 y.o.     Sex: male   Language: English   Christianity: [employer]   GI bleed     Date: 10/4/2024                           Spiritual Assessment began in [facility] 4S INTERMEDIATE 1        Referral/Consult From: Rounding   Encounter Overview/Reason:  Encounter, Rituals, Rites and Sacraments  Service Provided For: Patient    Geeta, Belief, Meaning:   Patient is connected with a geeta tradition or spiritual practice  Family/Friends are connected with a geeta tradition or spiritual practice      Importance and Influence:  Patient has no beliefs influential to healthcare decision-making identified during this visit  Family/Friends have no beliefs influential to healthcare decision-making identified during this visit    Community:  Patient feels well-supported. Support system includes: Spouse/Partner  Family/Friends feel well-supported. Support system includes: Extended family    Assessment and Plan of Care:     Patient Interventions include: Provided sacramental/Mosque ritual  Family/Friends Interventions include: Affirmed coping skills/support systems    Patient Plan of Care: Spiritual Care available upon further referral  Family/Friends Plan of Care: Spiritual Care available upon further referral    Electronically signed by Chaplain Chani on 10/4/2024 at 3:15 PM   
Spoke w nuclear med who spoke w GI . They will continue w GI blood loss scan ordered  
Vicky Thompson NP on floor with Dr. Duran, notified of K of 3.1 this AM.   
    Nutrition Diagnosis:   Inadequate oral intake related to altered GI function as evidenced by NPO or clear liquid status due to medical condition, GI abnormality    Nutrition Interventions:   Food and/or Nutrient Delivery: Continue NPO  Nutrition Education/Counseling: No recommendation at this time  Coordination of Nutrition Care: Continue to monitor while inpatient       Goals:     Goals: other (specify), by next RD assessment  Specify Other Goals: nutrition progression    Nutrition Monitoring and Evaluation:      Food/Nutrient Intake Outcomes: Diet Advancement/Tolerance  Physical Signs/Symptoms Outcomes: Biochemical Data, Diarrhea, GI Status, Fluid Status or Edema, Nutrition Focused Physical Findings, Skin, Weight    Discharge Planning:    Too soon to determine     Miranda D'Amico, RD, LD  Contact: [phone]    
MD   2 mg at 10/04/24 0814    [Held by provider] ascorbic acid (VITAMIN C) tablet 1,000 mg  1,000 mg Oral Dinner Delio Granda MD   1,000 mg at 10/03/24 1642    clonazePAM (KLONOPIN) tablet 0.5 mg  0.5 mg Oral Q12H PRN Delio Granda MD   0.5 mg at 10/04/24 1238    cetirizine (ZYRTEC) tablet 10 mg  10 mg Oral Daily Delio Granda MD   10 mg at 10/04/24 0814    DULoxetine (CYMBALTA) extended release capsule 60 mg  60 mg Oral BID WC Delio Granda MD   60 mg at 10/04/24 1603    finasteride (PROSCAR) tablet 5 mg  5 mg Oral Nightly Delio Granda MD   5 mg at 10/04/24 2014    levothyroxine (SYNTHROID) tablet 75 mcg  75 mcg Oral QAM AC Delio Granda MD   75 mcg at 10/05/24 0633    montelukast (SINGULAIR) tablet 10 mg  10 mg Oral QAM Delio Granda MD   10 mg at 10/04/24 0813    [Held by provider] tamsulosin (FLOMAX) capsule 0.4 mg  0.4 mg Oral Nightly Delio Granda MD   0.4 mg at 10/03/24 2137    sodium chloride flush 0.9 % injection 5-40 mL  5-40 mL IntraVENous 2 times per day Delio Granda MD   10 mL at 10/04/24 2015    sodium chloride flush 0.9 % injection 5-40 mL  5-40 mL IntraVENous PRN Delio Granda MD        0.9 % sodium chloride infusion   IntraVENous PRN Delio Granda MD        ondansetron (ZOFRAN-ODT) disintegrating tablet 4 mg  4 mg Oral Q8H PRN Delio Granda MD        Or    ondansetron (ZOFRAN) injection 4 mg  4 mg IntraVENous Q6H PRN Delio Granda MD        acetaminophen (TYLENOL) tablet 650 mg  650 mg Oral Q6H PRN Delio Granda MD   650 mg at 10/04/24 0559    Or    acetaminophen (TYLENOL) suppository 650 mg  650 mg Rectal Q6H PRN Delio Granda MD        glucose chewable tablet 16 g  4 tablet Oral PRN Delio Granda MD        dextrose bolus 10% 125 mL  125 mL IntraVENous PRN Delio Granda MD        Or    dextrose bolus 10% 250 mL  250 mL IntraVENous PRN Delio Granda MD        glucagon injection 1 mg  1 mg SubCUTAneous PRDelio Melendez MD        dextrose 10 % infusion   IntraVENous Continuous PRDelio Melendez MD 
chloride      sodium chloride      dextrose         MEDS (prn):  white petrolatum, sodium chloride, melatonin, sodium chloride, sodium chloride, clonazePAM, sodium chloride flush, sodium chloride, ondansetron **OR** ondansetron, acetaminophen **OR** acetaminophen, glucose, dextrose bolus **OR** dextrose bolus, glucagon (rDNA), dextrose    PHYSICAL EXAM:     Patient Vitals for the past 24 hrs:   BP Temp Temp src Pulse Resp SpO2   10/07/24 1045 125/65 98.6 °F (37 °C) Oral 62 18 94 %   10/07/24 0745 -- -- -- -- -- 94 %   10/07/24 0648 121/66 98.4 °F (36.9 °C) Oral -- 16 --   10/07/24 0526 110/68 98.3 °F (36.8 °C) Oral 70 16 95 %   10/07/24 0031 (!) 119/49 98.3 °F (36.8 °C) Oral 78 16 94 %   10/06/24 1939 (!) 124/58 98.5 °F (36.9 °C) Oral 62 18 95 %   @    No intake or output data in the 24 hours ending 10/07/24 1453        Wt Readings from Last 3 Encounters:   10/05/24 79.6 kg (175 lb 8 oz)   11/14/17 75.3 kg (166 lb)   11/28/16 73.9 kg (163 lb)       Constitutional:  in no acute distress  HEENT: NC/AT, EOMI, sclera and conjunctiva are clear and anicteric, mucus membranes moist  Neck: Trachea midline, no JVD  Cardiovascular: S1, S2 regular rhythm, no murmur,or rub  Respiratory:  No crackles, no wheeze  Gastrointestinal:  Soft, nontender, nondistended, NABS  Ext: no edema, feet warm  Skin: dry, no rash  Neuro: awake, alert, interactive      DATA:    Recent Labs     10/07/24  0025 10/07/24  0544 10/07/24  1017   WBC  --  15.7*  --    HGB 7.8* 8.2* 8.9*   HCT 23.5* 25.3* 26.9*   MCV  --  96.6  --    PLT  --  175  --      Recent Labs     10/05/24  0629 10/06/24  0208 10/07/24  0544    145 143   K 3.5 2.8* 3.0*   * 108* 109*   CO2 24 29 23   MG 1.3* 2.0 1.9   PHOS  --  2.6 2.4*   BUN 34* 26* 22   CREATININE 2.9* 2.3* 2.1*   ALT  --   --  6   AST  --   --  16   BILITOT  --   --  0.3   ALKPHOS  --   --  38*       No results found for: \"LABPROT\"      Assessment  Acute kidney injury hemodynamically mediated by 
independent   Transfers Sit to stand: NT  Stand to sit: NT  Stand pivot: NT  Pt felt increased dizziness with just sitting EOB  SBA   Ambulation    NT  100+ feet with w/w SBA    Stair negotiation: ascended and descended  NT      ROM BLE:  WFL     Strength BLE:  grossly 4/5  Grossly 4+/5   Balance Sitting EOB:  supervision  Dynamic Standing:  NT  Sitting EOB:  independent  Dynamic Standing:  SBA with w/w   AM-PAC 6 Clicks 10/24       Pt is A & O x 3  Sensation:  Pt denies numbness and tingling to extremities    Patient education  Pt educated on PT objectives during eval and while in the hospital, safety during mobility.    Patient response to education:   Pt verbalized understanding Pt demonstrated skill Pt requires further education in this area       yes     ASSESSMENT:    Conditions Requiring Skilled Therapeutic Intervention:    [x]Decreased strength     []Decreased ROM  [x]Decreased functional mobility  [x]Decreased balance   [x]Decreased endurance   []Decreased posture  []Decreased sensation  []Decreased coordination   []Decreased vision  []Decreased safety awareness   [x]Increased pain       Comments:  Pt found in bed with family present.  Pt reported feeling dizzy once sitting EOB which decreased with seated rest.  Pt was scooting closer to the EOB and felt increased dizziness again and felt as if he might pass out.  Pt assisted back into bed and pt reported feeling better.    At end of eval, pt left in bed with call light in reach and bed alarm on and family present.      Pt's/ family goals   1. None stated    Prognosis is good for reaching above PT goals.    Patient and or family understand(s) diagnosis, prognosis, and plan of care.  yes    PHYSICAL THERAPY PLAN OF CARE:    PT POC is established based on physician order and patient diagnosis     Diagnosis:  GI bleed [K92.2]  Lower GI bleed [K92.2]    Current Treatment Recommendations:     [x] Strengthening to improve independence with functional mobility 
lispro  0-4 Units SubCUTAneous TID     insulin lispro  0-4 Units SubCUTAneous Nightly    midodrine  5 mg Oral TID WC       MEDS (infusions):   lactated ringers IV soln 110 mL/hr at 10/09/24 1128    sodium chloride      sodium chloride      sodium chloride      sodium chloride      sodium chloride      dextrose         MEDS (prn):  Loperamide HCl, white petrolatum, sodium chloride, melatonin, sodium chloride, sodium chloride, clonazePAM, sodium chloride flush, sodium chloride, ondansetron **OR** ondansetron, acetaminophen **OR** acetaminophen, glucose, dextrose bolus **OR** dextrose bolus, glucagon (rDNA), dextrose    PHYSICAL EXAM:     Patient Vitals for the past 24 hrs:   BP Temp Temp src Pulse Resp SpO2 Weight   10/09/24 1100 (!) 145/71 99 °F (37.2 °C) Oral 84 20 95 % --   10/09/24 0700 125/62 97.8 °F (36.6 °C) Oral -- 20 -- --   10/09/24 0400 (!) 118/58 98.2 °F (36.8 °C) Oral 77 18 94 % --   10/09/24 0034 -- -- -- -- -- -- 91.4 kg (201 lb 9.6 oz)   10/08/24 2140 125/65 98.1 °F (36.7 °C) -- 98 18 94 % --   10/08/24 1827 (!) 128/52 98.1 °F (36.7 °C) -- 86 18 91 % --   10/08/24 1620 132/62 97.4 °F (36.3 °C) Oral 82 16 91 % --   10/08/24 1545 106/74 -- -- 82 14 94 % --   10/08/24 1525 (!) 94/52 96.8 °F (36 °C) Tympanic 84 14 95 % --   @      Intake/Output Summary (Last 24 hours) at 10/9/2024 1352  Last data filed at 10/9/2024 0556  Gross per 24 hour   Intake 3241.91 ml   Output 850 ml   Net 2391.91 ml           Wt Readings from Last 3 Encounters:   10/09/24 91.4 kg (201 lb 9.6 oz)   11/14/17 75.3 kg (166 lb)   11/28/16 73.9 kg (163 lb)       Constitutional:  in no acute distress  HEENT: NC/AT, EOMI, sclera and conjunctiva are clear and anicteric, mucus membranes moist  Neck: Trachea midline, no JVD  Cardiovascular: S1, S2 regular rhythm, no murmur,or rub  Respiratory:  No crackles, no wheeze  Gastrointestinal:  Soft, nontender, nondistended, NABS  Ext: no edema, feet warm  Skin: dry, no rash  Neuro: awake, alert, 
-- -- -- 81.6 kg (179 lb 12.8 oz)   10/13/24 2156 120/75 98.2 °F (36.8 °C) -- 87 18 95 % --   10/13/24 2025 -- -- -- -- -- 96 % --   10/13/24 1836 (!) 141/69 98 °F (36.7 °C) -- 93 18 92 % --   10/13/24 1500 121/65 98.1 °F (36.7 °C) Oral -- 18 -- --   @      Intake/Output Summary (Last 24 hours) at 10/14/2024 1228  Last data filed at 10/14/2024 0822  Gross per 24 hour   Intake 400 ml   Output 1500 ml   Net -1100 ml           Wt Readings from Last 3 Encounters:   10/14/24 81.6 kg (179 lb 12.8 oz)   11/14/17 75.3 kg (166 lb)   11/28/16 73.9 kg (163 lb)       Constitutional:  in no acute distress  HEENT: NC/AT, EOMI, sclera and conjunctiva are clear and anicteric, mucus membranes moist  Neck: Trachea midline, no JVD  Cardiovascular: S1, S2 regular rhythm, no murmur,or rub  Respiratory:  CTAB. No crackles, no wheeze  Gastrointestinal:  Soft,  mildly tender, nondistended, NABS  Ext:  1+ BLE edema,  Feet warm  Skin: dry, no rash  Neuro: awake, alert, interactive.  Moves all 4 extremities.  Appears weak, debilitated      DATA:    Recent Labs     10/12/24  0410 10/13/24  0134 10/14/24  0411   WBC 12.9* 13.1* 12.4*   HGB 9.0* 9.7* 10.0*   HCT 29.7* 30.7* 32.4*   .0* 98.7 100.0*    374 420     Recent Labs     10/12/24  0410 10/13/24  0134 10/14/24  0411    143 142   K 3.8 3.5 3.2*   * 108* 107   CO2 19* 21* 22   MG 1.6 1.4* 1.5*   PHOS 3.4  --   --    BUN 13 13 13   CREATININE 1.8* 1.9* 1.8*       No results found for: \"LABPROT\"      Assessment  Acute kidney injury hemodynamically mediated by hypotension secondary to acute blood loss anemia.  There is no hydronephrosis or obstructive process on CT scan of the abdomen.  Chronic kidney disease stage IIIb, baseline creatinine is unknown.  His creatinine level was 1.8 mg/dL in 2016.  Etiology is likely diabetic nephropathy. Call placed to Dr. Christianson's office to see what his baseline creatinine level is (1.8 mg/dL).   Metabolic acidosis secondary to SHITAL, 
03:20 AM    CO2 20 10/11/2024 03:20 AM    BUN 14 10/11/2024 03:20 AM    CREATININE 1.7 10/11/2024 03:20 AM    GFRAA 46 08/30/2016 11:41 AM    LABGLOM 43 10/11/2024 03:20 AM    GLUCOSE 103 10/11/2024 03:20 AM    CALCIUM 7.8 10/11/2024 03:20 AM    BILITOT 0.3 10/07/2024 05:44 AM    ALKPHOS 38 10/07/2024 05:44 AM    AST 16 10/07/2024 05:44 AM    ALT 6 10/07/2024 05:44 AM     Hepatic Function Panel:    Lab Results   Component Value Date/Time    ALKPHOS 38 10/07/2024 05:44 AM    ALT 6 10/07/2024 05:44 AM    AST 16 10/07/2024 05:44 AM    BILITOT 0.3 10/07/2024 05:44 AM    BILIDIR <0.2 02/19/2014 07:35 AM     No components found for: \"CHLPL\"  No results found for: \"TRIG\"  No results found for: \"HDL\"  No components found for: \"LDLCALC\"  No components found for: \"LABVLDL\"   PT/INR:    Lab Results   Component Value Date/Time    PROTIME 19.9 10/03/2024 07:30 AM    INR 1.9 10/03/2024 07:30 AM     IRON:    Lab Results   Component Value Date/Time    IRON 39 10/03/2024 07:30 AM     Iron Saturation:  No components found for: \"PERCENTFE\"  FERRITIN:    Lab Results   Component Value Date/Time    FERRITIN 65 10/03/2024 07:30 AM       Assessment:   Hematochezia; suspected diverticular bleed   Anemia, normocytic  LLQ pain  Diverticulosis  Hemorrhoids  Lactic acidosis-defer  CKD- SHITAL  Colonoscopy 10/8/24- 1.  Normal colonoscopy with minimal diverticulosis and prior ileocecectomy (S-E). No fresh or old blood throughout entire exam. Good to fair colonoscopy prep.   Diarrhea - resolving, 2 bms today    Plan:   Hgb stable  Lomotil TID  Zenpep ordered  Protonix 40 MG PO BID  Diet as tolerated   Medical management per primary care  Anusol cream twice daily  Supportive care  Ok to resume anticoagulants   Okay to discharge from GI point of view when okay with others  We will sign off call again if needed    Discussed with Dr. Barkley  Plan per Dr. Rubia Alvarado, APRN - CNP  10/11/2024 11:38 AM For Dr. Barkley        
10/06/2024 02:08 AM    K 2.8 10/06/2024 02:08 AM     10/06/2024 02:08 AM    CO2 29 10/06/2024 02:08 AM    BUN 26 10/06/2024 02:08 AM    CREATININE 2.3 10/06/2024 02:08 AM    GFRAA 46 08/30/2016 11:41 AM    LABGLOM 28 10/06/2024 02:08 AM    GLUCOSE 120 10/06/2024 02:08 AM    CALCIUM 6.6 10/06/2024 02:08 AM    BILITOT 0.2 10/03/2024 07:30 AM    ALKPHOS 50 10/03/2024 07:30 AM    AST 14 10/03/2024 07:30 AM    ALT 8 10/03/2024 07:30 AM     Hepatic Function Panel:    Lab Results   Component Value Date/Time    ALKPHOS 50 10/03/2024 07:30 AM    ALT 8 10/03/2024 07:30 AM    AST 14 10/03/2024 07:30 AM    BILITOT 0.2 10/03/2024 07:30 AM    BILIDIR <0.2 02/19/2014 07:35 AM     No components found for: \"CHLPL\"  No results found for: \"TRIG\"  No results found for: \"HDL\"  No components found for: \"LDLCALC\"  No components found for: \"LABVLDL\"   PT/INR:    Lab Results   Component Value Date/Time    PROTIME 19.9 10/03/2024 07:30 AM    INR 1.9 10/03/2024 07:30 AM     IRON:    Lab Results   Component Value Date/Time    IRON 39 10/03/2024 07:30 AM     Iron Saturation:  No components found for: \"PERCENTFE\"  FERRITIN:    Lab Results   Component Value Date/Time    FERRITIN 65 10/03/2024 07:30 AM     NM GI BLOOD LOSS    Result Date: 10/3/2024  EXAMINATION: NUCLEAR MEDICINE GASTRIC BLEEDING STUDY 10/3/2024 TECHNIQUE: Following the intravenous injection of 20.9 mCi of 99 mTc-labeled RBC's, a flow study and standard images of the abdomen was obtained over a total period of 60 minutes COMPARISON: None. HISTORY: ORDERING SYSTEM PROVIDED HISTORY: hematochezia TECHNOLOGIST PROVIDED HISTORY: Reason for exam:->hematochezia What reading provider will be dictating this exam?->CRC FINDINGS: Activity is seen within the blood pool, including the great vessels, heart, liver, and spleen.  A small amount of activity accumulates in the bladder over the course of the study.  Physiologic penile activity is present.  There was no abnormal accumulation of 
Improving.  Off the fluids.    3.  Anasarca-echo this admission.    4.  Right bundle branch block and hyperglycemia-right bundle branch block-chronic and stable and hyperglycemia therapy per hospitalist.    5.  Continue to follow.  
fww    Balance Sitting:     Static: fair plus     Dynamic: fair plus   Standing: fair with fww     CGA/min A with WW during self care Sitting:     Static: good    Dynamic: good  Standing: good with fww    Activity Tolerance fair  minus/fair ; reported dizziness/light-headedness while sitting upright initially but subsided once sitting in chair post-activity.  Fair  Intermitted reports of dizziness but able to participate without worsening Increase standing tolerance >3 minutes for improved engagement with functional transfers and indep in ADLs      Visual/  Perceptual Glasses: N/A       NA    UE ROM/Strength        Right UE:   AROM: WFL   Strength: 4-/5  -good  and wfl FMC/dexterity noted during ADL tasks     Left UE:   AROM : WFL   Strength: 4-/5  -good  and wfl FMC/dexterity noted during ADL tasks    Functional use of UEs  Improve overall B UE strength for participation in functional tasks     Comments:  patient cleared with nursing and agreeable to session. Good effort and improvement demonstrated. Wife present. Patient in chair with call light in reach and alarm on    Education/treatment: ADL and functional transfer/activity performed to increase safety and independence during self care tasks. Education provided on safety awareness, adl reeducation, functional transfer training    Pt has made progress towards set goals.     Time In: 1:33  Time Out: 1:56     Min Units   Therapeutic Ex 54416     Therapeutic Activities 63416 23 2   ADL/Self Care 44918     Orthotic Management 85147     Neuro Re-Ed 45920     Non-Billable Time     TOTAL TIMED TREATMENT 23 2       Malia HUGHES/ELIZABETH [id number]    
ml           Wt Readings from Last 3 Encounters:   10/13/24 84.1 kg (185 lb 6.4 oz)   11/14/17 75.3 kg (166 lb)   11/28/16 73.9 kg (163 lb)       Constitutional:  in no acute distress  HEENT: NC/AT, EOMI, sclera and conjunctiva are clear and anicteric, mucus membranes moist  Neck: Trachea midline, no JVD  Cardiovascular: S1, S2 regular rhythm, no murmur,or rub  Respiratory:  CTAB. No crackles, no wheeze  Gastrointestinal:  Soft,  mildly tender, nondistended, NABS  Ext:  1-2+ BLE edema, 1+ LUE edema.  Feet warm  Skin: dry, no rash  Neuro: awake, alert, interactive.  Moves all 4 extremities.  Appears weak, debilitated      DATA:    Recent Labs     10/11/24  0320 10/12/24  0410 10/13/24  0134   WBC 14.9* 12.9* 13.1*   HGB 8.8* 9.0* 9.7*   HCT 27.3* 29.7* 30.7*   MCV 96.1 101.0* 98.7    313 374     Recent Labs     10/11/24  0320 10/12/24  0410 10/13/24  0134    141 143   K 3.1* 3.8 3.5   * 113* 108*   CO2 20* 19* 21*   MG 1.9 1.6 1.4*   PHOS  --  3.4  --    BUN 14 13 13   CREATININE 1.7* 1.8* 1.9*       No results found for: \"LABPROT\"      Assessment  Acute kidney injury hemodynamically mediated by hypotension secondary to acute blood loss anemia.  There is no hydronephrosis or obstructive process on CT scan of the abdomen.  Chronic kidney disease stage IIIb, baseline creatinine is unknown.  His creatinine level was 1.8 mg/dL in 2016.  Etiology is likely diabetic nephropathy. Call placed to Dr. Christianson's office to see what his baseline creatinine level is.   Metabolic acidosis secondary to SHITAL, and lactic acidosis.  Ongoing diarrhea refractory now.  CKD likely contributing, also.  Improved, but still low  Lactic acidosis due to GI bleed, hypotension.  Resolved  Hyperkalemia due to decreased effective volume and SHITAL.  Resolved with treatment  Anemia due to GI bleed and acute blood loss.  Status post IV Ferrlecit x 4 doses  Diabetes mellitus      Baseline cr 1.6-1.8      Recommendation  Redose bumex iv 1 
no wheeze  Gastrointestinal:  Soft,  mildly tender, nondistended, NABS  Ext:  1+ BLE edema, 1+ LUE edemafeet warm  Skin: dry, no rash  Neuro: awake, alert, interactive      DATA:    Recent Labs     10/08/24  0449 10/09/24  0123 10/10/24  0928   WBC  --   --  15.0*   HGB 8.3* 8.8* 8.9*   HCT 25.8* 27.5* 27.4*   MCV  --   --  97.2   PLT  --   --  270     Recent Labs     10/08/24  0449 10/08/24  1630 10/09/24  0123 10/10/24  0334    143 144 142   K 3.1* 3.4* 3.1* 3.2*   * 112* 112* 113*   CO2 21* 16* 18* 18*   MG 1.8  --  1.7 1.5*   PHOS  --   --   --  2.6   BUN 19 18 19 15   CREATININE 2.1* 1.8* 1.9* 1.7*       No results found for: \"LABPROT\"      Assessment  Acute kidney injury hemodynamically mediated by hypotension secondary to acute blood loss anemia.  There is no hydronephrosis or obstructive process on CT scan of the abdomen.  Chronic kidney disease stage IIIb, baseline creatinine is unknown.  His creatinine level was 1.8 mg/dL in 2016.  Etiology is likely diabetic nephropathy. Call placed to Dr. Christianson's office to see what his baseline creatinine level is.   Metabolic acidosis secondary to SHITAL.  Lactic acidosis due to GI bleed, hypotension   Hyperkalemia due to decreased effective volume and SHITAL  Anemia due to GI bleed and acute blood loss  Diabetes mellitus    HCO3 stable, though low  Azotemia stable-?:  New baseline?- creatinine improving 1.9-->1.7 mg/dl  Na- wnl  K- 3.2  Mg 1.5      Recommendation  Encourage oral intake as able  Agree with K and Mg replacement  DC IVF  May need to turn attention to diuresis in next day or two  IV ferrlecit x 4 doses - done  Avoid nephrotoxins   Strict intake and output   Monitor I&O  Follow labs  Tubi   Continue supportive renal care        Electronically signed by DANIELA NORMAN, NOHEMY - CNP on 10/10/2024 at 1:51 PM    NOTE: This report was transcribed using voice recognition software. Every effort was made to ensure accuracy; however, inadvertent 
renal failure-improving, probably secondary to GI blood loss and fluid loss due to diarrhea.  Improving.  Off the fluids.    3.  Anasarca-improved but diuresis has been held by nephrology.    4.  Right bundle branch block and hyperglycemia-right bundle branch block-chronic and stable and hyperglycemia therapy per hospitalist.    5.  Continue to follow.  
evidence of active GI bleeding during acquisition.     ADDENDUM:  A 24 hour delayed image was obtained.  There is no abnormal activity within  the bowel.    Assessment:   Hematochezia; suspected diverticular etiology   Anemia, normocytic  LLQ pain  Diverticulosis  Hemorrhoids  Lactic acidosis-defer  CKD- SHITAL  Colonoscopy 10/8/24- 1.  Normal colonoscopy with minimal diverticulosis and prior ileocecectomy (S-E). No fresh or old blood throughout entire exam. Good to fair colonoscopy prep.   Diarrhea     Plan:     Hgb stable, no overt bleeding   Lomotil TID  Zenpep ordered  Protonix 40 MG PO BID  Diet as tolerated   Medical management per primary care  Anusol cream twice daily  Supportive care  Ok to resume anticoagulants   Discharge planning     Discussed with Dr. Barkley  Plan per Dr. Rubia Sonw, APRN - CNP  10/9/2024 10:59 AM For Dr. Barkley    GI attending addendum:    The patient case was reviewed and discussed with the GI midlevel team.       Brian Barkley DO    
is no abnormal activity within  the bowel.    Assessment:     Hematochezia  Anemia, normocytic  LLQ pain  Diverticulosis  Hemorrhoids  Lactic acidosis-defer  CKD- SHITAL      Plan:   Bleeding scan (delayed) report noted  Serial H&H, transfuse less than 7 per primary care  Protonix 40 MG PO BID  Clear liquid diet   Medical management per primary care  Colonoscopy planned for tomorrow  Npo after midnight   Please continue to monitor and document stools  Anusol cream twice daily  Supportive care  Trend labs  Will follow    Discussed with Dr. Barkley  Plan per Dr. Rubia Snow, NOHEMY - CNP  10/7/2024 10:57 AM For Dr. Barkley    GI attending addendum:    The patient case was reviewed and discussed with the GI midlevel team.       Brian Barkley, DO      
10/3  Hold Flomax for now  Monitor BP trends and adjust as indicated    Acute Kidney Injury superimposed on CKD Stage III with Hyperkalemia and NAGMA  Baseline serum creatinine around 1.2 based on prior lab trends  Serum creatinine trended up  -- likely due to hypotension from GI bleed  Continue IV fluids   Monitor BMP  Nephrology consult appreciated  Bicarb gtt changed to normal saline    Hyperglycemia  A1C 5.5%  SSI for now    Hypothyroidism  Continue Synthroid    Depression/Anxiety  Continue Abilify, Cymbalta as at home  Continue as needed Klonopin as at home    Hx Pulmonary Embolism  Chronically anticoagulated with Xarelto -- holding for now due to GI bleed/anemia    Generalized Weakness  Likely related to GI bleed/worsening anemia  PT AM-PAC-- TBD  OT AM- PAC-- TBD    Follow labs   DVT prophylaxis  Please see orders for further management and care.  Discharge plan: TBD pending clinical course    The pertinent details of this case were discussed with Dr. Granda.    Electronically signed by NOHEMY Greenberg CNP on 10/6/2024 at 8:36 AM    I can be reached through [employer].      
rhythm, normal S1 and split S2, no murmurs, no S3 or S4, no pericardial rubs.  Carotid upstrokes brisk  Resp: clear bilaterally without wheezes, rhonchi, or rales; unlabored respirations  Abdomen: soft, nontender, nondistended, BS+; no masses, bruits, or hepatomegaly  Extremities: no cyanosis, clubbing, or edema.  Distal pulses intact  Skin: Warm and dry, no rashes or lesions  Neuro: moves all extremities to command, no focal deficits noted    Intake/Output:    Intake/Output Summary (Last 24 hours) at 10/13/2024 0955  Last data filed at 10/13/2024 0357  Gross per 24 hour   Intake --   Output 3050 ml   Net -3050 ml     No intake/output data recorded.    Laboratory Tests:  Last 3 CBC:  Recent Labs     10/11/24  0320 10/12/24  0410 10/13/24  0134   WBC 14.9* 12.9* 13.1*   RBC 2.84* 2.94* 3.11*   HGB 8.8* 9.0* 9.7*   HCT 27.3* 29.7* 30.7*   MCV 96.1 101.0* 98.7   MCH 31.0 30.6 31.2   MCHC 32.2 30.3* 31.6*   RDW 18.7* 19.3* 19.2*    313 374   MPV 11.7 12.0 12.0       Last 3 CMP:    Recent Labs     10/11/24  0320 10/12/24  0410 10/13/24  0134    141 143   K 3.1* 3.8 3.5   * 113* 108*   CO2 20* 19* 21*   BUN 14 13 13   CREATININE 1.7* 1.8* 1.9*   GLUCOSE 103* 97 103*   CALCIUM 7.8* 7.9* 8.2*       Last 3 Mag/Phos:  Recent Labs     10/11/24  0320 10/12/24  0410 10/13/24  0134   MG 1.9 1.6 1.4*   PHOS  --  3.4  --              Last 3 Glucose:     Recent Labs     10/11/24  0320 10/12/24  0410 10/13/24  0134   GLUCOSE 103* 97 103*       Last 3 Coags:  No results for input(s): \"PROTIME\", \"INR\" in the last 72 hours.    Invalid input(s): \"PTT\"  Lab Results   Component Value Date/Time    PROTIME 19.9 10/03/2024 07:30 AM    INR 1.9 10/03/2024 07:30 AM           TSH:  No results for input(s): \"TSH\" in the last 72 hours.  Lab Results   Component Value Date/Time    TSH 0.620 07/16/2014 05:05 AM           Radiology:  XR CHEST PORTABLE   Final Result   Development of additional discrete increased density in the left 
be relate with atelectasis and or discrete pleural reaction.         NM LUNG SCAN PERFUSION ONLY   Final Result   Low Probability for Pulmonary Embolus.         Vascular duplex upper extremity venous left   Final Result   There is no evidence of deep vein thrombosis.      Superficial vein thrombosis involving the cephalic and median cubital veins.         XR CHEST PORTABLE   Final Result   No acute cardiopulmonary process.         CT ABDOMEN PELVIS WO CONTRAST Additional Contrast? None   Final Result   Distal descending and proximal sigmoid colonic diverticulitis.      Old, mildly displaced S2 vertebral body fracture.      Hepatic steatosis, splenectomy changes and 2 mm nonobstructing renal calculus   are present.         NM GI BLOOD LOSS   Final Result   Addendum (preliminary) 1 of 1   ADDENDUM:   A 24 hour delayed image was obtained.  There is no abnormal activity    within   the bowel.         Final   No evidence of active GI bleeding during acquisition.              Left Ventricle Normal left ventricular systolic function with a visually estimated EF of 60 - 65%. Left ventricle size is normal. Mildly increased wall thickness. No regional wall motion abnormalities identified. Decreased sensitivity due to poor endocardial definition. Grade II diastolic dysfunction with increased LAP.   Left Atrium Left atrium is moderately dilated.   Interatrial Septum No interatrial shunt visualized with color Doppler.   Right Ventricle Right ventricle size is normal. Normal systolic function.   Right Atrium Right atrium size is normal.   Aortic Valve Not well visualized. No regurgitation. No stenosis.   Mitral Valve Valve structure is normal. Moderate regurgitation with a centrally directed jet. No stenosis noted.   Tricuspid Valve Valve structure is normal. Trace regurgitation. No stenosis noted. Unable to assess RVSP.   Pulmonic Valve The pulmonic valve was not well visualized. Mild regurgitation with a centrally directed jet. 
benefit from SNF prior to returning home when stable    The pertinent details of this case were discussed with Dr. Duran.    Electronically signed by NOHEMY Wheeler CNP on 10/8/2024 at 6:21 AM    Addendum: I have personally participated in a face-to-face history and physical exam on the date of service with the patient. I have discussed the case with the nurse practitioner. I also participated in medical decision making on the date of service and I agree with all of the pertinent clinical information unless indicated in my editing of the note. I have reviewed and edited the note above based on my findings during my history, exam, and decision making on the same day of service.     My additional thoughts:   He was seen and examined in his room lying in bed resting very comfortably  He has a colonoscopy scheduled today at 3 PM  His serum creatinine is slightly improved  Nephrology input is appreciated and they are managing IV fluid hydration  H&H has remained relatively stable  Anticoagulation is on hold due to GI bleeding still  Most likely he will need to go to a skilled nursing facility once he reaches medical stability which is not today  Continue antibiotics for diverticulitis    Electronically signed by Ish Duran DO on 10/8/2024 at 9:51 AM    I can be reached through [employer].    
discharge planning -- patient/wife declining SNF/HHC    Follow labs   DVT prophylaxis  Please see orders for further management and care.  Discharge plan: would likely benefit from SNF prior to returning home when stable -- hopefully soon if H&H remains stable with resuming anticoagulation    The pertinent details of this case were discussed with Dr. Duran.    Electronically signed by NOHEMY Wheeler CNP on 10/9/2024 at 7:10 AM    Addendum: I have personally participated in a face-to-face history and physical exam on the date of service with the patient. I have discussed the case with the nurse practitioner. I also participated in medical decision making on the date of service and I agree with all of the pertinent clinical information unless indicated in my editing of the note. I have reviewed and edited the note above based on my findings during my history, exam, and decision making on the same day of service.     My additional thoughts:   He was seen and examined in his room he was resting very comfortably  The colonoscopy went well and no signs of bleeding were noted  Xarelto was restarted per GI recommendations  I will defer stopping IV fluid to the nephrology service but this can probably be stopped  He is having a lot of diarrhea and I asked nursing staff to notify GI about this  He is very weak but he did somewhat better getting around in his room-she wants to go home with home health care on discharge but I think he probably would benefit from rehab  Consideration for discharge as soon as tomorrow if his labs are stable    Electronically signed by Ish Duran DO on 10/9/2024 at 11:49 AM    I can be reached through [employer].    
Ventricle Right ventricle size is normal. Normal systolic function.   Right Atrium Right atrium size is normal.   Aortic Valve Not well visualized. No regurgitation. No stenosis.   Mitral Valve Valve structure is normal. Moderate regurgitation with a centrally directed jet. No stenosis noted.   Tricuspid Valve Valve structure is normal. Trace regurgitation. No stenosis noted. Unable to assess RVSP.   Pulmonic Valve The pulmonic valve was not well visualized. Mild regurgitation with a centrally directed jet. No stenosis noted.   Aorta Normal sized aortic root.   Pericardium Moderate (1-2 cm) localized posterior pericardial effusion present. No indication of cardiac tamponade.         ASSESSMENT / PLAN:  1.  Marked orthostatic hypotension-was on tamsulosin for prostate hypertrophy which was stopped as it exacerbates orthostasis.  Also on finasteride which can exacerbate orthostasis as well.  Consider trial off of finasteride.  Was on 5 mg thrice daily of midodrine but increased to 10 mg 3 times daily.    Now off of fluids. Yesterday's orthostatics improved dropped 21 points from laying to standing.     2.  Acute renal failure-improving, probably secondary to GI blood loss and fluid loss due to diarrhea.  Improving Cr 1.8>1.9 today's labs pending.  Off the fluids.     3.  Anasarca-echo as above. Self diuresing without diuretics, Says baseline weight has been 160. Replace electrolytes as needed.     4.  Right bundle branch block and hyperglycemia-right bundle branch block-chronic and stable and hyperglycemia therapy per hospitalist.             Mario Nash MD,  [facility]  [facility]    Electronically signed by Mario Nash MD on 10/15/2024 at 6:43 AM

## 2025-08-26 ENCOUNTER — HOSPITAL ENCOUNTER (OUTPATIENT)
Dept: LAB | Age: 75
Discharge: HOME | End: 2025-08-26
Payer: MEDICARE

## 2025-08-26 LAB
HCT VFR BLD AUTO: 24.7 % (ref 37–54)
HGB BLD-MCNC: 7.3 G/DL (ref 12.5–16.5)

## 2025-08-26 PROCEDURE — 85014 HEMATOCRIT: CPT

## 2025-08-26 PROCEDURE — 85018 HEMOGLOBIN: CPT

## 2025-08-26 PROCEDURE — 36415 COLL VENOUS BLD VENIPUNCTURE: CPT

## 2025-08-27 ENCOUNTER — HOSPITAL ENCOUNTER (INPATIENT)
Age: 75
LOS: 3 days | Discharge: HOME | DRG: 378 | End: 2025-08-30
Admitting: INTERNAL MEDICINE
Payer: MEDICARE

## 2025-08-27 DIAGNOSIS — Z79.01 ON CONTINUOUS ORAL ANTICOAGULATION: ICD-10-CM

## 2025-08-27 DIAGNOSIS — D64.9 SYMPTOMATIC ANEMIA: Primary | ICD-10-CM

## 2025-08-27 DIAGNOSIS — D64.9 ANEMIA, UNSPECIFIED TYPE: ICD-10-CM

## 2025-08-27 DIAGNOSIS — K92.2 GASTROINTESTINAL HEMORRHAGE, UNSPECIFIED GASTROINTESTINAL HEMORRHAGE TYPE: ICD-10-CM

## 2025-08-27 LAB
ALBUMIN SERPL-MCNC: 3.8 G/DL (ref 3.5–5.2)
ALP SERPL-CCNC: 95 U/L (ref 40–129)
ALT SERPL-CCNC: 19 U/L (ref 0–50)
ANION GAP SERPL CALCULATED.3IONS-SCNC: 16 MMOL/L (ref 7–16)
AST SERPL-CCNC: 28 U/L (ref 0–50)
BASOPHILS # BLD: 0.17 K/UL (ref 0–0.2)
BASOPHILS NFR BLD: 2 % (ref 0–2)
BILIRUB SERPL-MCNC: 0.3 MG/DL (ref 0–1.2)
BUN SERPL-MCNC: 22 MG/DL (ref 8–23)
CALCIUM SERPL-MCNC: 9.6 MG/DL (ref 8.8–10.2)
CHLORIDE SERPL-SCNC: 111 MMOL/L (ref 98–107)
CO2 SERPL-SCNC: 15 MMOL/L (ref 22–29)
CREAT SERPL-MCNC: 1.8 MG/DL (ref 0.7–1.2)
EOSINOPHIL # BLD: 0.25 K/UL (ref 0.05–0.5)
EOSINOPHILS RELATIVE PERCENT: 3 % (ref 0–6)
ERYTHROCYTE [DISTWIDTH] IN BLOOD BY AUTOMATED COUNT: 18.2 % (ref 11.5–15)
GFR, ESTIMATED: 39 ML/MIN/1.73M2
GLUCOSE SERPL-MCNC: 120 MG/DL (ref 74–99)
HCT VFR BLD AUTO: 21.6 % (ref 37–54)
HCT VFR BLD AUTO: 25 % (ref 37–54)
HGB BLD-MCNC: 6.2 G/DL (ref 12.5–16.5)
HGB BLD-MCNC: 7.1 G/DL (ref 12.5–16.5)
LYMPHOCYTES NFR BLD: 1.33 K/UL (ref 1.5–4)
LYMPHOCYTES RELATIVE PERCENT: 14 % (ref 20–42)
MCH RBC QN AUTO: 21.8 PG (ref 26–35)
MCHC RBC AUTO-ENTMCNC: 28.4 G/DL (ref 32–34.5)
MCV RBC AUTO: 76.9 FL (ref 80–99.9)
METAMYELOCYTES ABSOLUTE COUNT: 0.17 K/UL (ref 0–0.12)
METAMYELOCYTES: 2 % (ref 0–1)
MONOCYTES NFR BLD: 0.66 K/UL (ref 0.1–0.95)
MONOCYTES NFR BLD: 7 % (ref 2–12)
MYELOCYTES ABSOLUTE COUNT: 0.08 K/UL
MYELOCYTES: 1 %
NEUTROPHILS NFR BLD: 71 % (ref 43–80)
NEUTS SEG NFR BLD: 6.64 K/UL (ref 1.8–7.3)
NUCLEATED RED BLOOD CELLS: 13 PER 100 WBC
PLATELET # BLD AUTO: 394 K/UL (ref 130–450)
PMV BLD AUTO: 10.2 FL (ref 7–12)
POTASSIUM SERPL-SCNC: 4.2 MMOL/L (ref 3.5–5.1)
PROT SERPL-MCNC: 7.2 G/DL (ref 6.4–8.3)
RBC # BLD AUTO: 3.25 M/UL (ref 3.8–5.8)
RBC # BLD: ABNORMAL 10*6/UL
SODIUM SERPL-SCNC: 141 MMOL/L (ref 136–145)
WBC OTHER # BLD: 9.3 K/UL (ref 4.5–11.5)

## 2025-08-27 PROCEDURE — 6360000002 HC RX W HCPCS: Performed by: INTERNAL MEDICINE

## 2025-08-27 PROCEDURE — 36430 TRANSFUSION BLD/BLD COMPNT: CPT

## 2025-08-27 PROCEDURE — 1200000000 HC SEMI PRIVATE

## 2025-08-27 PROCEDURE — P9016 RBC LEUKOCYTES REDUCED: HCPCS

## 2025-08-27 PROCEDURE — 99285 EMERGENCY DEPT VISIT HI MDM: CPT

## 2025-08-27 PROCEDURE — 2500000003 HC RX 250 WO HCPCS: Performed by: INTERNAL MEDICINE

## 2025-08-27 PROCEDURE — 85014 HEMATOCRIT: CPT

## 2025-08-27 PROCEDURE — 85025 COMPLETE CBC W/AUTO DIFF WBC: CPT

## 2025-08-27 PROCEDURE — 6370000000 HC RX 637 (ALT 250 FOR IP): Performed by: NURSE PRACTITIONER

## 2025-08-27 PROCEDURE — 30233N1 TRANSFUSION OF NONAUTOLOGOUS RED BLOOD CELLS INTO PERIPHERAL VEIN, PERCUTANEOUS APPROACH: ICD-10-PCS | Performed by: HOSPITALIST

## 2025-08-27 PROCEDURE — 86850 RBC ANTIBODY SCREEN: CPT

## 2025-08-27 PROCEDURE — 85018 HEMOGLOBIN: CPT

## 2025-08-27 PROCEDURE — 86901 BLOOD TYPING SEROLOGIC RH(D): CPT

## 2025-08-27 PROCEDURE — 86923 COMPATIBILITY TEST ELECTRIC: CPT

## 2025-08-27 PROCEDURE — 86900 BLOOD TYPING SEROLOGIC ABO: CPT

## 2025-08-27 PROCEDURE — 80053 COMPREHEN METABOLIC PANEL: CPT

## 2025-08-27 PROCEDURE — 6370000000 HC RX 637 (ALT 250 FOR IP): Performed by: INTERNAL MEDICINE

## 2025-08-27 PROCEDURE — 93005 ELECTROCARDIOGRAM TRACING: CPT

## 2025-08-27 RX ORDER — COLESTIPOL HYDROCHLORIDE 1 G/1
2 TABLET ORAL 2 TIMES DAILY
Status: ON HOLD | COMMUNITY
End: 2025-08-29 | Stop reason: HOSPADM

## 2025-08-27 RX ORDER — POTASSIUM CHLORIDE 7.45 MG/ML
10 INJECTION INTRAVENOUS PRN
Status: DISCONTINUED | OUTPATIENT
Start: 2025-08-27 | End: 2025-08-30 | Stop reason: HOSPADM

## 2025-08-27 RX ORDER — TAMSULOSIN HYDROCHLORIDE 0.4 MG/1
0.4 CAPSULE ORAL NIGHTLY
COMMUNITY

## 2025-08-27 RX ORDER — ACETAMINOPHEN 325 MG/1
650 TABLET ORAL EVERY 6 HOURS PRN
Status: DISCONTINUED | OUTPATIENT
Start: 2025-08-27 | End: 2025-08-30 | Stop reason: HOSPADM

## 2025-08-27 RX ORDER — LEVOTHYROXINE SODIUM 75 UG/1
75 TABLET ORAL
Status: DISCONTINUED | OUTPATIENT
Start: 2025-08-28 | End: 2025-08-30 | Stop reason: HOSPADM

## 2025-08-27 RX ORDER — DICYCLOMINE HYDROCHLORIDE 10 MG/1
10 CAPSULE ORAL 2 TIMES DAILY
COMMUNITY

## 2025-08-27 RX ORDER — ONDANSETRON 2 MG/ML
4 INJECTION INTRAMUSCULAR; INTRAVENOUS EVERY 6 HOURS PRN
Status: DISCONTINUED | OUTPATIENT
Start: 2025-08-27 | End: 2025-08-30 | Stop reason: HOSPADM

## 2025-08-27 RX ORDER — DULOXETIN HYDROCHLORIDE 60 MG/1
60 CAPSULE, DELAYED RELEASE ORAL 2 TIMES DAILY WITH MEALS
Status: DISCONTINUED | OUTPATIENT
Start: 2025-08-27 | End: 2025-08-30 | Stop reason: HOSPADM

## 2025-08-27 RX ORDER — MONTELUKAST SODIUM 10 MG/1
10 TABLET ORAL EVERY MORNING
Status: DISCONTINUED | OUTPATIENT
Start: 2025-08-28 | End: 2025-08-30 | Stop reason: HOSPADM

## 2025-08-27 RX ORDER — ONDANSETRON 4 MG/1
4 TABLET, ORALLY DISINTEGRATING ORAL EVERY 8 HOURS PRN
Status: DISCONTINUED | OUTPATIENT
Start: 2025-08-27 | End: 2025-08-30 | Stop reason: HOSPADM

## 2025-08-27 RX ORDER — POTASSIUM CHLORIDE 1500 MG/1
40 TABLET, EXTENDED RELEASE ORAL PRN
Status: DISCONTINUED | OUTPATIENT
Start: 2025-08-27 | End: 2025-08-30 | Stop reason: HOSPADM

## 2025-08-27 RX ORDER — SENNOSIDES 8.6 MG/1
1 TABLET ORAL DAILY PRN
Status: DISCONTINUED | OUTPATIENT
Start: 2025-08-27 | End: 2025-08-30 | Stop reason: HOSPADM

## 2025-08-27 RX ORDER — ACETAMINOPHEN 650 MG/1
650 SUPPOSITORY RECTAL EVERY 6 HOURS PRN
Status: DISCONTINUED | OUTPATIENT
Start: 2025-08-27 | End: 2025-08-30 | Stop reason: HOSPADM

## 2025-08-27 RX ORDER — CLONAZEPAM 0.5 MG/1
1 TABLET ORAL EVERY 12 HOURS
Status: DISCONTINUED | OUTPATIENT
Start: 2025-08-27 | End: 2025-08-28

## 2025-08-27 RX ORDER — ARIPIPRAZOLE 2 MG/1
2 TABLET ORAL EVERY MORNING
Status: DISCONTINUED | OUTPATIENT
Start: 2025-08-28 | End: 2025-08-30 | Stop reason: HOSPADM

## 2025-08-27 RX ORDER — SODIUM CHLORIDE 9 MG/ML
INJECTION, SOLUTION INTRAVENOUS PRN
Status: DISCONTINUED | OUTPATIENT
Start: 2025-08-27 | End: 2025-08-30 | Stop reason: HOSPADM

## 2025-08-27 RX ORDER — SODIUM CHLORIDE 0.9 % (FLUSH) 0.9 %
10 SYRINGE (ML) INJECTION PRN
Status: DISCONTINUED | OUTPATIENT
Start: 2025-08-27 | End: 2025-08-30 | Stop reason: HOSPADM

## 2025-08-27 RX ORDER — SODIUM CHLORIDE 0.9 % (FLUSH) 0.9 %
10 SYRINGE (ML) INJECTION EVERY 12 HOURS SCHEDULED
Status: DISCONTINUED | OUTPATIENT
Start: 2025-08-27 | End: 2025-08-30 | Stop reason: HOSPADM

## 2025-08-27 RX ORDER — MAGNESIUM SULFATE IN WATER 40 MG/ML
2000 INJECTION, SOLUTION INTRAVENOUS PRN
Status: DISCONTINUED | OUTPATIENT
Start: 2025-08-27 | End: 2025-08-30 | Stop reason: HOSPADM

## 2025-08-27 RX ORDER — FINASTERIDE 5 MG/1
5 TABLET, FILM COATED ORAL NIGHTLY
Status: DISCONTINUED | OUTPATIENT
Start: 2025-08-27 | End: 2025-08-30 | Stop reason: HOSPADM

## 2025-08-27 RX ORDER — SODIUM BICARBONATE 650 MG/1
650 TABLET ORAL 2 TIMES DAILY
Status: DISCONTINUED | OUTPATIENT
Start: 2025-08-27 | End: 2025-08-28

## 2025-08-27 RX ORDER — PANTOPRAZOLE SODIUM 40 MG/10ML
40 INJECTION, POWDER, LYOPHILIZED, FOR SOLUTION INTRAVENOUS 2 TIMES DAILY
Status: DISCONTINUED | OUTPATIENT
Start: 2025-08-27 | End: 2025-08-27 | Stop reason: SDUPTHER

## 2025-08-27 RX ORDER — MIDODRINE HYDROCHLORIDE 5 MG/1
10 TABLET ORAL
Status: DISCONTINUED | OUTPATIENT
Start: 2025-08-27 | End: 2025-08-28

## 2025-08-27 RX ADMIN — SODIUM BICARBONATE 650 MG: 650 TABLET ORAL at 21:32

## 2025-08-27 RX ADMIN — PANTOPRAZOLE SODIUM 40 MG: 40 INJECTION, POWDER, FOR SOLUTION INTRAVENOUS at 21:31

## 2025-08-27 RX ADMIN — CLONAZEPAM 1 MG: 0.5 TABLET ORAL at 23:18

## 2025-08-27 RX ADMIN — FINASTERIDE 5 MG: 5 TABLET, FILM COATED ORAL at 21:32

## 2025-08-27 RX ADMIN — DULOXETINE 60 MG: 60 CAPSULE, DELAYED RELEASE ORAL at 18:11

## 2025-08-27 RX ADMIN — SODIUM CHLORIDE, PRESERVATIVE FREE 10 ML: 5 INJECTION INTRAVENOUS at 21:31

## 2025-08-27 ASSESSMENT — PAIN SCALES - GENERAL
PAINLEVEL_OUTOF10: 0
PAINLEVEL_OUTOF10: 0

## 2025-08-27 ASSESSMENT — PAIN - FUNCTIONAL ASSESSMENT
PAIN_FUNCTIONAL_ASSESSMENT: 0-10
PAIN_FUNCTIONAL_ASSESSMENT: 0-10

## 2025-08-28 ENCOUNTER — ANESTHESIA EVENT (OUTPATIENT)
Dept: ENDOSCOPY | Age: 75
End: 2025-08-28
Payer: MEDICARE

## 2025-08-28 ENCOUNTER — ANESTHESIA (OUTPATIENT)
Dept: ENDOSCOPY | Age: 75
End: 2025-08-28
Payer: MEDICARE

## 2025-08-28 LAB
ABO/RH: NORMAL
ALBUMIN SERPL-MCNC: 3.3 G/DL (ref 3.5–5.2)
ALP SERPL-CCNC: 81 U/L (ref 40–129)
ALT SERPL-CCNC: 16 U/L (ref 0–50)
ANION GAP SERPL CALCULATED.3IONS-SCNC: 13 MMOL/L (ref 7–16)
ANTIBODY SCREEN: NEGATIVE
ARM BAND NUMBER: NORMAL
AST SERPL-CCNC: 27 U/L (ref 0–50)
BASOPHILS # BLD: 0.1 K/UL (ref 0–0.2)
BASOPHILS NFR BLD: 1 % (ref 0–2)
BILIRUB SERPL-MCNC: 0.4 MG/DL (ref 0–1.2)
BLOOD BANK BLOOD PRODUCT EXPIRATION DATE: NORMAL
BLOOD BANK DISPENSE STATUS: NORMAL
BLOOD BANK ISBT PRODUCT BLOOD TYPE: 9500
BLOOD BANK PRODUCT CODE: NORMAL
BLOOD BANK SAMPLE EXPIRATION: NORMAL
BLOOD BANK UNIT TYPE AND RH: NORMAL
BPU ID: NORMAL
BUN SERPL-MCNC: 22 MG/DL (ref 8–23)
CALCIUM SERPL-MCNC: 8.9 MG/DL (ref 8.8–10.2)
CHLORIDE SERPL-SCNC: 114 MMOL/L (ref 98–107)
CO2 SERPL-SCNC: 15 MMOL/L (ref 22–29)
COMPONENT: NORMAL
CREAT SERPL-MCNC: 1.8 MG/DL (ref 0.7–1.2)
CROSSMATCH RESULT: NORMAL
EOSINOPHIL # BLD: 0.29 K/UL (ref 0.05–0.5)
EOSINOPHILS RELATIVE PERCENT: 3 % (ref 0–6)
ERYTHROCYTE [DISTWIDTH] IN BLOOD BY AUTOMATED COUNT: 18.7 % (ref 11.5–15)
GFR, ESTIMATED: 38 ML/MIN/1.73M2
GLUCOSE SERPL-MCNC: 103 MG/DL (ref 74–99)
HCT VFR BLD AUTO: 24.3 % (ref 37–54)
HCT VFR BLD AUTO: 25.6 % (ref 37–54)
HCT VFR BLD AUTO: 26.3 % (ref 37–54)
HCT VFR BLD AUTO: 26.5 % (ref 37–54)
HGB BLD-MCNC: 7.6 G/DL (ref 12.5–16.5)
HGB BLD-MCNC: 7.9 G/DL (ref 12.5–16.5)
HGB BLD-MCNC: 8.1 G/DL (ref 12.5–16.5)
HGB BLD-MCNC: 8.2 G/DL (ref 12.5–16.5)
INR PPP: 1.1
LYMPHOCYTES NFR BLD: 2.96 K/UL (ref 1.5–4)
LYMPHOCYTES RELATIVE PERCENT: 28 % (ref 20–42)
MCH RBC QN AUTO: 23.5 PG (ref 26–35)
MCHC RBC AUTO-ENTMCNC: 31.3 G/DL (ref 32–34.5)
MCV RBC AUTO: 75 FL (ref 80–99.9)
MONOCYTES NFR BLD: 0.67 K/UL (ref 0.1–0.95)
MONOCYTES NFR BLD: 6 % (ref 2–12)
NEUTROPHILS NFR BLD: 62 % (ref 43–80)
NEUTS SEG NFR BLD: 6.39 K/UL (ref 1.8–7.3)
NUCLEATED RED BLOOD CELLS: 11 PER 100 WBC
PLATELET # BLD AUTO: 361 K/UL (ref 130–450)
PMV BLD AUTO: 10.6 FL (ref 7–12)
POTASSIUM SERPL-SCNC: 4.3 MMOL/L (ref 3.5–5.1)
PROT SERPL-MCNC: 6.3 G/DL (ref 6.4–8.3)
PROTHROMBIN TIME: 11.4 SEC (ref 9.3–12.4)
RBC # BLD AUTO: 3.24 M/UL (ref 3.8–5.8)
RBC # BLD: ABNORMAL 10*6/UL
SODIUM SERPL-SCNC: 143 MMOL/L (ref 136–145)
TRANSFUSION STATUS: NORMAL
UNIT DIVISION: 0
UNIT ISSUE DATE/TIME: NORMAL
WBC OTHER # BLD: 10.4 K/UL (ref 4.5–11.5)

## 2025-08-28 PROCEDURE — 6370000000 HC RX 637 (ALT 250 FOR IP): Performed by: INTERNAL MEDICINE

## 2025-08-28 PROCEDURE — 6370000000 HC RX 637 (ALT 250 FOR IP): Performed by: NURSE PRACTITIONER

## 2025-08-28 PROCEDURE — 6360000002 HC RX W HCPCS: Performed by: INTERNAL MEDICINE

## 2025-08-28 PROCEDURE — 80053 COMPREHEN METABOLIC PANEL: CPT

## 2025-08-28 PROCEDURE — 2500000003 HC RX 250 WO HCPCS: Performed by: INTERNAL MEDICINE

## 2025-08-28 PROCEDURE — 6370000000 HC RX 637 (ALT 250 FOR IP): Performed by: HOSPITALIST

## 2025-08-28 PROCEDURE — 85025 COMPLETE CBC W/AUTO DIFF WBC: CPT

## 2025-08-28 PROCEDURE — 85014 HEMATOCRIT: CPT

## 2025-08-28 PROCEDURE — 1200000000 HC SEMI PRIVATE

## 2025-08-28 PROCEDURE — 85610 PROTHROMBIN TIME: CPT

## 2025-08-28 PROCEDURE — 85018 HEMOGLOBIN: CPT

## 2025-08-28 RX ORDER — TAMSULOSIN HYDROCHLORIDE 0.4 MG/1
0.4 CAPSULE ORAL NIGHTLY
Status: DISCONTINUED | OUTPATIENT
Start: 2025-08-28 | End: 2025-08-30 | Stop reason: HOSPADM

## 2025-08-28 RX ORDER — DICYCLOMINE HYDROCHLORIDE 10 MG/1
10 CAPSULE ORAL 2 TIMES DAILY
Status: DISCONTINUED | OUTPATIENT
Start: 2025-08-28 | End: 2025-08-30 | Stop reason: HOSPADM

## 2025-08-28 RX ORDER — CLONAZEPAM 0.5 MG/1
0.5 TABLET ORAL EVERY 12 HOURS PRN
Status: DISCONTINUED | OUTPATIENT
Start: 2025-08-28 | End: 2025-08-28

## 2025-08-28 RX ORDER — CLONAZEPAM 0.5 MG/1
0.5 TABLET ORAL EVERY 12 HOURS PRN
Status: DISCONTINUED | OUTPATIENT
Start: 2025-08-28 | End: 2025-08-30 | Stop reason: HOSPADM

## 2025-08-28 RX ORDER — TAMSULOSIN HYDROCHLORIDE 0.4 MG/1
0.4 CAPSULE ORAL NIGHTLY
Status: DISCONTINUED | OUTPATIENT
Start: 2025-08-28 | End: 2025-08-28

## 2025-08-28 RX ORDER — DICYCLOMINE HYDROCHLORIDE 10 MG/1
10 CAPSULE ORAL 2 TIMES DAILY
Status: DISCONTINUED | OUTPATIENT
Start: 2025-08-28 | End: 2025-08-28

## 2025-08-28 RX ADMIN — LEVOTHYROXINE SODIUM 75 MCG: 0.07 TABLET ORAL at 05:08

## 2025-08-28 RX ADMIN — SODIUM CHLORIDE, PRESERVATIVE FREE 10 ML: 5 INJECTION INTRAVENOUS at 09:05

## 2025-08-28 RX ADMIN — PANCRELIPASE LIPASE, PANCRELIPASE PROTEASE, PANCRELIPASE AMYLASE 20000 UNITS: 20000; 63000; 84000 CAPSULE, DELAYED RELEASE ORAL at 17:07

## 2025-08-28 RX ADMIN — MIDODRINE HYDROCHLORIDE 10 MG: 5 TABLET ORAL at 09:04

## 2025-08-28 RX ADMIN — DULOXETINE 60 MG: 60 CAPSULE, DELAYED RELEASE ORAL at 17:07

## 2025-08-28 RX ADMIN — DICYCLOMINE HYDROCHLORIDE 10 MG: 10 CAPSULE ORAL at 20:13

## 2025-08-28 RX ADMIN — FINASTERIDE 5 MG: 5 TABLET, FILM COATED ORAL at 20:13

## 2025-08-28 RX ADMIN — CLONAZEPAM 1 MG: 0.5 TABLET ORAL at 09:52

## 2025-08-28 RX ADMIN — PANTOPRAZOLE SODIUM 40 MG: 40 INJECTION, POWDER, FOR SOLUTION INTRAVENOUS at 20:14

## 2025-08-28 RX ADMIN — DICYCLOMINE HYDROCHLORIDE 10 MG: 10 CAPSULE ORAL at 14:44

## 2025-08-28 RX ADMIN — SODIUM CHLORIDE, PRESERVATIVE FREE 10 ML: 5 INJECTION INTRAVENOUS at 21:06

## 2025-08-28 RX ADMIN — DULOXETINE 60 MG: 60 CAPSULE, DELAYED RELEASE ORAL at 09:04

## 2025-08-28 RX ADMIN — TAMSULOSIN HYDROCHLORIDE 0.4 MG: 0.4 CAPSULE ORAL at 20:13

## 2025-08-28 RX ADMIN — SODIUM BICARBONATE 650 MG: 650 TABLET ORAL at 09:04

## 2025-08-28 RX ADMIN — ARIPIPRAZOLE 2 MG: 2 TABLET ORAL at 09:04

## 2025-08-28 RX ADMIN — PANTOPRAZOLE SODIUM 40 MG: 40 INJECTION, POWDER, FOR SOLUTION INTRAVENOUS at 08:19

## 2025-08-28 RX ADMIN — CLONAZEPAM 0.5 MG: 0.5 TABLET ORAL at 22:50

## 2025-08-28 RX ADMIN — MONTELUKAST SODIUM 10 MG: 10 TABLET, FILM COATED ORAL at 09:04

## 2025-08-28 ASSESSMENT — PAIN - FUNCTIONAL ASSESSMENT: PAIN_FUNCTIONAL_ASSESSMENT: 0-10

## 2025-08-28 ASSESSMENT — PAIN SCALES - GENERAL
PAINLEVEL_OUTOF10: 0

## 2025-08-29 LAB
ALBUMIN SERPL-MCNC: 3.5 G/DL (ref 3.5–5.2)
ALP SERPL-CCNC: 91 U/L (ref 40–129)
ALT SERPL-CCNC: 18 U/L (ref 0–50)
ANION GAP SERPL CALCULATED.3IONS-SCNC: 13 MMOL/L (ref 7–16)
AST SERPL-CCNC: 26 U/L (ref 0–50)
BASOPHILS # BLD: 0.04 K/UL (ref 0–0.2)
BASOPHILS NFR BLD: 0 % (ref 0–2)
BILIRUB SERPL-MCNC: 0.6 MG/DL (ref 0–1.2)
BUN SERPL-MCNC: 19 MG/DL (ref 8–23)
CALCIUM SERPL-MCNC: 9.1 MG/DL (ref 8.8–10.2)
CHLORIDE SERPL-SCNC: 115 MMOL/L (ref 98–107)
CO2 SERPL-SCNC: 15 MMOL/L (ref 22–29)
CREAT SERPL-MCNC: 1.7 MG/DL (ref 0.7–1.2)
EKG ATRIAL RATE: 86 BPM
EKG P AXIS: 61 DEGREES
EKG P-R INTERVAL: 168 MS
EKG Q-T INTERVAL: 366 MS
EKG QRS DURATION: 76 MS
EKG QTC CALCULATION (BAZETT): 437 MS
EKG R AXIS: -4 DEGREES
EKG T AXIS: 75 DEGREES
EKG VENTRICULAR RATE: 86 BPM
EOSINOPHIL # BLD: 0.23 K/UL (ref 0.05–0.5)
EOSINOPHILS RELATIVE PERCENT: 2 % (ref 0–6)
ERYTHROCYTE [DISTWIDTH] IN BLOOD BY AUTOMATED COUNT: 18.4 % (ref 11.5–15)
GFR, ESTIMATED: 40 ML/MIN/1.73M2
GLUCOSE SERPL-MCNC: 114 MG/DL (ref 74–99)
HCT VFR BLD AUTO: 25.9 % (ref 37–54)
HCT VFR BLD AUTO: 26.2 % (ref 37–54)
HCT VFR BLD AUTO: 26.8 % (ref 37–54)
HCT VFR BLD AUTO: 28 % (ref 37–54)
HGB BLD-MCNC: 8.1 G/DL (ref 12.5–16.5)
HGB BLD-MCNC: 8.3 G/DL (ref 12.5–16.5)
HGB BLD-MCNC: 8.4 G/DL (ref 12.5–16.5)
HGB BLD-MCNC: 8.4 G/DL (ref 12.5–16.5)
IMM GRANULOCYTES # BLD AUTO: 0.04 K/UL (ref 0–0.58)
IMM GRANULOCYTES NFR BLD: 0 % (ref 0–5)
LYMPHOCYTES NFR BLD: 3.03 K/UL (ref 1.5–4)
LYMPHOCYTES RELATIVE PERCENT: 27 % (ref 20–42)
MCH RBC QN AUTO: 23.3 PG (ref 26–35)
MCHC RBC AUTO-ENTMCNC: 31.3 G/DL (ref 32–34.5)
MCV RBC AUTO: 74.4 FL (ref 80–99.9)
MONOCYTES NFR BLD: 1.3 K/UL (ref 0.1–0.95)
MONOCYTES NFR BLD: 11 % (ref 2–12)
NEUTROPHILS NFR BLD: 60 % (ref 43–80)
NEUTS SEG NFR BLD: 6.8 K/UL (ref 1.8–7.3)
PLATELET # BLD AUTO: 361 K/UL (ref 130–450)
PMV BLD AUTO: 10.5 FL (ref 7–12)
POTASSIUM SERPL-SCNC: 3.8 MMOL/L (ref 3.5–5.1)
PROT SERPL-MCNC: 6.7 G/DL (ref 6.4–8.3)
RBC # BLD AUTO: 3.48 M/UL (ref 3.8–5.8)
SODIUM SERPL-SCNC: 142 MMOL/L (ref 136–145)
WBC OTHER # BLD: 11.4 K/UL (ref 4.5–11.5)

## 2025-08-29 PROCEDURE — 7100000010 HC PHASE II RECOVERY - FIRST 15 MIN: Performed by: INTERNAL MEDICINE

## 2025-08-29 PROCEDURE — 3700000000 HC ANESTHESIA ATTENDED CARE: Performed by: INTERNAL MEDICINE

## 2025-08-29 PROCEDURE — 2709999900 HC NON-CHARGEABLE SUPPLY: Performed by: INTERNAL MEDICINE

## 2025-08-29 PROCEDURE — 6370000000 HC RX 637 (ALT 250 FOR IP): Performed by: INTERNAL MEDICINE

## 2025-08-29 PROCEDURE — 85018 HEMOGLOBIN: CPT

## 2025-08-29 PROCEDURE — 85025 COMPLETE CBC W/AUTO DIFF WBC: CPT

## 2025-08-29 PROCEDURE — 1200000000 HC SEMI PRIVATE

## 2025-08-29 PROCEDURE — 80053 COMPREHEN METABOLIC PANEL: CPT

## 2025-08-29 PROCEDURE — 2580000003 HC RX 258: Performed by: NURSE ANESTHETIST, CERTIFIED REGISTERED

## 2025-08-29 PROCEDURE — 6360000002 HC RX W HCPCS: Performed by: NURSE ANESTHETIST, CERTIFIED REGISTERED

## 2025-08-29 PROCEDURE — 3609012400 HC EGD TRANSORAL BIOPSY SINGLE/MULTIPLE: Performed by: INTERNAL MEDICINE

## 2025-08-29 PROCEDURE — 85014 HEMATOCRIT: CPT

## 2025-08-29 PROCEDURE — 7100000011 HC PHASE II RECOVERY - ADDTL 15 MIN: Performed by: INTERNAL MEDICINE

## 2025-08-29 PROCEDURE — 88305 TISSUE EXAM BY PATHOLOGIST: CPT

## 2025-08-29 PROCEDURE — 6360000002 HC RX W HCPCS: Performed by: INTERNAL MEDICINE

## 2025-08-29 PROCEDURE — 0DB98ZX EXCISION OF DUODENUM, VIA NATURAL OR ARTIFICIAL OPENING ENDOSCOPIC, DIAGNOSTIC: ICD-10-PCS | Performed by: INTERNAL MEDICINE

## 2025-08-29 PROCEDURE — 88342 IMHCHEM/IMCYTCHM 1ST ANTB: CPT

## 2025-08-29 PROCEDURE — 2500000003 HC RX 250 WO HCPCS: Performed by: INTERNAL MEDICINE

## 2025-08-29 PROCEDURE — 93010 ELECTROCARDIOGRAM REPORT: CPT | Performed by: INTERNAL MEDICINE

## 2025-08-29 PROCEDURE — 6370000000 HC RX 637 (ALT 250 FOR IP): Performed by: HOSPITALIST

## 2025-08-29 PROCEDURE — 0DB68ZX EXCISION OF STOMACH, VIA NATURAL OR ARTIFICIAL OPENING ENDOSCOPIC, DIAGNOSTIC: ICD-10-PCS | Performed by: INTERNAL MEDICINE

## 2025-08-29 PROCEDURE — 3700000001 HC ADD 15 MINUTES (ANESTHESIA): Performed by: INTERNAL MEDICINE

## 2025-08-29 RX ORDER — SODIUM CHLORIDE 9 MG/ML
INJECTION, SOLUTION INTRAVENOUS PRN
Status: DISCONTINUED | OUTPATIENT
Start: 2025-08-29 | End: 2025-08-29 | Stop reason: HOSPADM

## 2025-08-29 RX ORDER — DROPERIDOL 2.5 MG/ML
0.62 INJECTION, SOLUTION INTRAMUSCULAR; INTRAVENOUS
Status: DISCONTINUED | OUTPATIENT
Start: 2025-08-29 | End: 2025-08-29 | Stop reason: HOSPADM

## 2025-08-29 RX ORDER — HYDRALAZINE HYDROCHLORIDE 20 MG/ML
10 INJECTION INTRAMUSCULAR; INTRAVENOUS
Status: DISCONTINUED | OUTPATIENT
Start: 2025-08-29 | End: 2025-08-29 | Stop reason: HOSPADM

## 2025-08-29 RX ORDER — PROCHLORPERAZINE EDISYLATE 5 MG/ML
5 INJECTION INTRAMUSCULAR; INTRAVENOUS
Status: DISCONTINUED | OUTPATIENT
Start: 2025-08-29 | End: 2025-08-29 | Stop reason: HOSPADM

## 2025-08-29 RX ORDER — LABETALOL HYDROCHLORIDE 5 MG/ML
10 INJECTION, SOLUTION INTRAVENOUS
Status: DISCONTINUED | OUTPATIENT
Start: 2025-08-29 | End: 2025-08-29 | Stop reason: HOSPADM

## 2025-08-29 RX ORDER — PROPOFOL 10 MG/ML
INJECTION, EMULSION INTRAVENOUS
Status: DISCONTINUED | OUTPATIENT
Start: 2025-08-29 | End: 2025-08-29 | Stop reason: SDUPTHER

## 2025-08-29 RX ORDER — SODIUM CHLORIDE 0.9 % (FLUSH) 0.9 %
5-40 SYRINGE (ML) INJECTION PRN
Status: DISCONTINUED | OUTPATIENT
Start: 2025-08-29 | End: 2025-08-29 | Stop reason: HOSPADM

## 2025-08-29 RX ORDER — PANTOPRAZOLE SODIUM 40 MG/1
40 TABLET, DELAYED RELEASE ORAL
Qty: 60 TABLET | Refills: 0 | Status: SHIPPED | OUTPATIENT
Start: 2025-08-29

## 2025-08-29 RX ORDER — SODIUM CHLORIDE 9 MG/ML
INJECTION, SOLUTION INTRAVENOUS
Status: DISCONTINUED | OUTPATIENT
Start: 2025-08-29 | End: 2025-08-29 | Stop reason: SDUPTHER

## 2025-08-29 RX ORDER — SODIUM CHLORIDE 0.9 % (FLUSH) 0.9 %
5-40 SYRINGE (ML) INJECTION EVERY 12 HOURS SCHEDULED
Status: DISCONTINUED | OUTPATIENT
Start: 2025-08-29 | End: 2025-08-29 | Stop reason: HOSPADM

## 2025-08-29 RX ADMIN — MONTELUKAST SODIUM 10 MG: 10 TABLET, FILM COATED ORAL at 15:34

## 2025-08-29 RX ADMIN — DULOXETINE 60 MG: 60 CAPSULE, DELAYED RELEASE ORAL at 15:34

## 2025-08-29 RX ADMIN — PANTOPRAZOLE SODIUM 40 MG: 40 INJECTION, POWDER, FOR SOLUTION INTRAVENOUS at 21:49

## 2025-08-29 RX ADMIN — ARIPIPRAZOLE 2 MG: 2 TABLET ORAL at 15:35

## 2025-08-29 RX ADMIN — SODIUM CHLORIDE, PRESERVATIVE FREE 10 ML: 5 INJECTION INTRAVENOUS at 08:20

## 2025-08-29 RX ADMIN — PANCRELIPASE LIPASE, PANCRELIPASE PROTEASE, PANCRELIPASE AMYLASE 20000 UNITS: 20000; 63000; 84000 CAPSULE, DELAYED RELEASE ORAL at 15:34

## 2025-08-29 RX ADMIN — DICYCLOMINE HYDROCHLORIDE 10 MG: 10 CAPSULE ORAL at 21:48

## 2025-08-29 RX ADMIN — FINASTERIDE 5 MG: 5 TABLET, FILM COATED ORAL at 21:48

## 2025-08-29 RX ADMIN — LEVOTHYROXINE SODIUM 75 MCG: 0.07 TABLET ORAL at 07:09

## 2025-08-29 RX ADMIN — SODIUM CHLORIDE, PRESERVATIVE FREE 10 ML: 5 INJECTION INTRAVENOUS at 22:48

## 2025-08-29 RX ADMIN — PROPOFOL 150 MG: 10 INJECTION, EMULSION INTRAVENOUS at 14:08

## 2025-08-29 RX ADMIN — PANTOPRAZOLE SODIUM 40 MG: 40 INJECTION, POWDER, FOR SOLUTION INTRAVENOUS at 08:19

## 2025-08-29 RX ADMIN — DICYCLOMINE HYDROCHLORIDE 10 MG: 10 CAPSULE ORAL at 15:35

## 2025-08-29 RX ADMIN — TAMSULOSIN HYDROCHLORIDE 0.4 MG: 0.4 CAPSULE ORAL at 21:48

## 2025-08-29 RX ADMIN — SODIUM CHLORIDE: 9 INJECTION, SOLUTION INTRAVENOUS at 13:50

## 2025-08-29 RX ADMIN — CLONAZEPAM 0.5 MG: 0.5 TABLET ORAL at 23:01

## 2025-08-29 RX ADMIN — CLONAZEPAM 0.5 MG: 0.5 TABLET ORAL at 15:34

## 2025-08-29 ASSESSMENT — PAIN - FUNCTIONAL ASSESSMENT
PAIN_FUNCTIONAL_ASSESSMENT: ACTIVITIES ARE NOT PREVENTED
PAIN_FUNCTIONAL_ASSESSMENT: 0-10

## 2025-08-29 ASSESSMENT — PAIN SCALES - GENERAL
PAINLEVEL_OUTOF10: 0

## 2025-08-29 ASSESSMENT — LIFESTYLE VARIABLES: SMOKING_STATUS: 0

## 2025-08-30 VITALS
SYSTOLIC BLOOD PRESSURE: 136 MMHG | HEIGHT: 69 IN | DIASTOLIC BLOOD PRESSURE: 73 MMHG | TEMPERATURE: 97.5 F | BODY MASS INDEX: 24.75 KG/M2 | HEART RATE: 96 BPM | OXYGEN SATURATION: 93 % | RESPIRATION RATE: 17 BRPM | WEIGHT: 167.11 LBS

## 2025-08-30 LAB
ALBUMIN SERPL-MCNC: 3.4 G/DL (ref 3.5–5.2)
ALP SERPL-CCNC: 91 U/L (ref 40–129)
ALT SERPL-CCNC: 16 U/L (ref 0–50)
ANION GAP SERPL CALCULATED.3IONS-SCNC: 15 MMOL/L (ref 7–16)
AST SERPL-CCNC: 26 U/L (ref 0–50)
BASOPHILS # BLD: 0.07 K/UL (ref 0–0.2)
BASOPHILS NFR BLD: 1 % (ref 0–2)
BILIRUB SERPL-MCNC: 0.8 MG/DL (ref 0–1.2)
BUN SERPL-MCNC: 18 MG/DL (ref 8–23)
CALCIUM SERPL-MCNC: 9.1 MG/DL (ref 8.8–10.2)
CHLORIDE SERPL-SCNC: 114 MMOL/L (ref 98–107)
CO2 SERPL-SCNC: 14 MMOL/L (ref 22–29)
CREAT SERPL-MCNC: 1.8 MG/DL (ref 0.7–1.2)
EOSINOPHIL # BLD: 0.36 K/UL (ref 0.05–0.5)
EOSINOPHILS RELATIVE PERCENT: 4 % (ref 0–6)
ERYTHROCYTE [DISTWIDTH] IN BLOOD BY AUTOMATED COUNT: 18.4 % (ref 11.5–15)
GFR, ESTIMATED: 39 ML/MIN/1.73M2
GLUCOSE SERPL-MCNC: 117 MG/DL (ref 74–99)
HCT VFR BLD AUTO: 25.8 % (ref 37–54)
HCT VFR BLD AUTO: 26.1 % (ref 37–54)
HGB BLD-MCNC: 8.1 G/DL (ref 12.5–16.5)
HGB BLD-MCNC: 8.2 G/DL (ref 12.5–16.5)
IMM GRANULOCYTES # BLD AUTO: 0.03 K/UL (ref 0–0.58)
IMM GRANULOCYTES NFR BLD: 0 % (ref 0–5)
LYMPHOCYTES NFR BLD: 2.62 K/UL (ref 1.5–4)
LYMPHOCYTES RELATIVE PERCENT: 26 % (ref 20–42)
MAGNESIUM SERPL-MCNC: 1.5 MG/DL (ref 1.6–2.4)
MCH RBC QN AUTO: 23.1 PG (ref 26–35)
MCHC RBC AUTO-ENTMCNC: 31.4 G/DL (ref 32–34.5)
MCV RBC AUTO: 73.7 FL (ref 80–99.9)
MONOCYTES NFR BLD: 1.25 K/UL (ref 0.1–0.95)
MONOCYTES NFR BLD: 13 % (ref 2–12)
NEUTROPHILS NFR BLD: 57 % (ref 43–80)
NEUTS SEG NFR BLD: 5.69 K/UL (ref 1.8–7.3)
PLATELET # BLD AUTO: 329 K/UL (ref 130–450)
PMV BLD AUTO: 10.4 FL (ref 7–12)
POTASSIUM SERPL-SCNC: 3.4 MMOL/L (ref 3.5–5.1)
PROT SERPL-MCNC: 6.6 G/DL (ref 6.4–8.3)
RBC # BLD AUTO: 3.5 M/UL (ref 3.8–5.8)
SODIUM SERPL-SCNC: 142 MMOL/L (ref 136–145)
WBC OTHER # BLD: 10 K/UL (ref 4.5–11.5)

## 2025-08-30 PROCEDURE — 85025 COMPLETE CBC W/AUTO DIFF WBC: CPT

## 2025-08-30 PROCEDURE — 6370000000 HC RX 637 (ALT 250 FOR IP): Performed by: HOSPITALIST

## 2025-08-30 PROCEDURE — 6360000002 HC RX W HCPCS: Performed by: INTERNAL MEDICINE

## 2025-08-30 PROCEDURE — 85018 HEMOGLOBIN: CPT

## 2025-08-30 PROCEDURE — 83735 ASSAY OF MAGNESIUM: CPT

## 2025-08-30 PROCEDURE — 6370000000 HC RX 637 (ALT 250 FOR IP): Performed by: INTERNAL MEDICINE

## 2025-08-30 PROCEDURE — 2500000003 HC RX 250 WO HCPCS: Performed by: INTERNAL MEDICINE

## 2025-08-30 PROCEDURE — 80053 COMPREHEN METABOLIC PANEL: CPT

## 2025-08-30 PROCEDURE — 36415 COLL VENOUS BLD VENIPUNCTURE: CPT

## 2025-08-30 PROCEDURE — 85014 HEMATOCRIT: CPT

## 2025-08-30 RX ADMIN — POTASSIUM CHLORIDE 40 MEQ: 1500 TABLET, EXTENDED RELEASE ORAL at 09:12

## 2025-08-30 RX ADMIN — PANCRELIPASE LIPASE, PANCRELIPASE PROTEASE, PANCRELIPASE AMYLASE 20000 UNITS: 20000; 63000; 84000 CAPSULE, DELAYED RELEASE ORAL at 12:04

## 2025-08-30 RX ADMIN — MONTELUKAST SODIUM 10 MG: 10 TABLET, FILM COATED ORAL at 07:51

## 2025-08-30 RX ADMIN — PANTOPRAZOLE SODIUM 40 MG: 40 INJECTION, POWDER, FOR SOLUTION INTRAVENOUS at 07:51

## 2025-08-30 RX ADMIN — DULOXETINE 60 MG: 60 CAPSULE, DELAYED RELEASE ORAL at 07:51

## 2025-08-30 RX ADMIN — DICYCLOMINE HYDROCHLORIDE 10 MG: 10 CAPSULE ORAL at 07:52

## 2025-08-30 RX ADMIN — PANCRELIPASE LIPASE, PANCRELIPASE PROTEASE, PANCRELIPASE AMYLASE 20000 UNITS: 20000; 63000; 84000 CAPSULE, DELAYED RELEASE ORAL at 07:51

## 2025-08-30 RX ADMIN — MAGNESIUM SULFATE HEPTAHYDRATE 2000 MG: 40 INJECTION, SOLUTION INTRAVENOUS at 09:16

## 2025-08-30 RX ADMIN — SODIUM CHLORIDE, PRESERVATIVE FREE 10 ML: 5 INJECTION INTRAVENOUS at 07:54

## 2025-08-30 RX ADMIN — CLONAZEPAM 0.5 MG: 0.5 TABLET ORAL at 12:24

## 2025-08-30 RX ADMIN — LEVOTHYROXINE SODIUM 75 MCG: 0.07 TABLET ORAL at 06:39

## 2025-08-30 RX ADMIN — ARIPIPRAZOLE 2 MG: 2 TABLET ORAL at 07:52

## 2025-09-04 LAB — SURGICAL PATHOLOGY REPORT: NORMAL

## (undated) DEVICE — SPONGE GZ W4XL4IN RAYON POLY CVR W/NONWOVEN FAB STRL 2/PK

## (undated) DEVICE — BLOCK BITE 60FR RUBBER ADLT DENTAL

## (undated) DEVICE — FORCEPS BX OVL CUP FEN DISPOSABLE CAP L 160CM RAD JAW 4

## (undated) DEVICE — GRADUATE TRIANG MEASURE 1000ML BLK PRNT

## (undated) DEVICE — REAGENT TEST UREASE RAPD CLOTEST F/